# Patient Record
Sex: MALE | Race: WHITE | NOT HISPANIC OR LATINO | Employment: OTHER | URBAN - METROPOLITAN AREA
[De-identification: names, ages, dates, MRNs, and addresses within clinical notes are randomized per-mention and may not be internally consistent; named-entity substitution may affect disease eponyms.]

---

## 2023-04-06 ENCOUNTER — PATIENT OUTREACH (OUTPATIENT)
Dept: CARE COORDINATION | Facility: CLINIC | Age: 73
End: 2023-04-06
Payer: MEDICARE

## 2023-04-06 DIAGNOSIS — I31.9 MYOPERICARDITIS (HHS-HCC): ICD-10-CM

## 2023-04-06 NOTE — PROGRESS NOTES
Discharge Facility:[facility]  Discharge Diagnosis:I31.9 Myopericarditis  Admission Date:4/4/23  Discharge Date:4/5/23    PCP appt:4/14/23    Engagement  Call Start Time: 1018 (4/6/2023 10:21 AM)    Medications  Medications reviewed with patient/caregiver?: Yes (4/6/2023 10:21 AM)  Is the patient having any side effects they believe may be caused by any medication additions or changes?: No (4/6/2023 10:21 AM)  Does the patient have all medications ordered at discharge?: No (Unable to fill new meds. Patient's doctors are working together to find the most appropriate regimen) (4/6/2023 10:21 AM)  Is the patient taking all medications as directed (includes completed medication regime)?: Yes (Unable to fill new meds. Patient's doctors are working together to find the most appropriate regimen) (4/6/2023 10:21 AM)    Appointments  Does the patient have a primary care provider?: Yes (4/6/2023 10:21 AM)  Care Management Interventions: Verified appointment date/time/provider (4/6/2023 10:21 AM)  Has the patient kept scheduled appointments due by today?: Yes (4/6/2023 10:21 AM)  Care Management Interventions: Advised patient to keep appointment; Educated on importance of keeping appointment (4/6/2023 10:21 AM)    Self Management  Has home health visited the patient within 72 hours of discharge?: Not applicable (4/6/2023 10:21 AM)    Patient Teaching  Does the patient have access to their discharge instructions?: Yes (4/6/2023 10:21 AM)  Care Management Interventions: Reviewed instructions with patient (4/6/2023 10:21 AM)  What is the patient's perception of their health status since discharge?: Improving (4/6/2023 10:21 AM)  Is the patient/caregiver able to teach back the hierarchy of who to call/visit for symptoms/problems? PCP, Specialist, Home Health nurse, Urgent Care, ED, 911: Yes (4/6/2023 10:21 AM)

## 2023-04-14 ENCOUNTER — OFFICE VISIT (OUTPATIENT)
Dept: PRIMARY CARE | Facility: CLINIC | Age: 73
End: 2023-04-14
Payer: MEDICARE

## 2023-04-14 VITALS
BODY MASS INDEX: 28.49 KG/M2 | HEIGHT: 68 IN | SYSTOLIC BLOOD PRESSURE: 108 MMHG | TEMPERATURE: 96.7 F | HEART RATE: 84 BPM | OXYGEN SATURATION: 96 % | WEIGHT: 188 LBS | DIASTOLIC BLOOD PRESSURE: 68 MMHG

## 2023-04-14 DIAGNOSIS — I10 HYPERTENSION, UNSPECIFIED TYPE: ICD-10-CM

## 2023-04-14 DIAGNOSIS — E78.5 HYPERLIPIDEMIA, UNSPECIFIED HYPERLIPIDEMIA TYPE: ICD-10-CM

## 2023-04-14 DIAGNOSIS — I31.9 MYOPERICARDITIS (HHS-HCC): Primary | ICD-10-CM

## 2023-04-14 DIAGNOSIS — I25.10 CORONARY ARTERY DISEASE INVOLVING NATIVE HEART, UNSPECIFIED VESSEL OR LESION TYPE, UNSPECIFIED WHETHER ANGINA PRESENT: ICD-10-CM

## 2023-04-14 PROBLEM — N40.0 BPH (BENIGN PROSTATIC HYPERPLASIA): Status: ACTIVE | Noted: 2023-04-14

## 2023-04-14 PROBLEM — I24.9 ACS (ACUTE CORONARY SYNDROME) (MULTI): Status: ACTIVE | Noted: 2023-04-05

## 2023-04-14 PROBLEM — E11.40 CONTROLLED TYPE 2 DIABETES MELLITUS WITH DIABETIC NEUROPATHY (MULTI): Status: ACTIVE | Noted: 2023-04-14

## 2023-04-14 PROBLEM — M19.90 ARTHRITIS: Status: ACTIVE | Noted: 2023-04-14

## 2023-04-14 PROCEDURE — 99495 TRANSJ CARE MGMT MOD F2F 14D: CPT | Performed by: FAMILY MEDICINE

## 2023-04-14 PROCEDURE — 3044F HG A1C LEVEL LT 7.0%: CPT | Performed by: FAMILY MEDICINE

## 2023-04-14 PROCEDURE — 3078F DIAST BP <80 MM HG: CPT | Performed by: FAMILY MEDICINE

## 2023-04-14 PROCEDURE — 1160F RVW MEDS BY RX/DR IN RCRD: CPT | Performed by: FAMILY MEDICINE

## 2023-04-14 PROCEDURE — 1036F TOBACCO NON-USER: CPT | Performed by: FAMILY MEDICINE

## 2023-04-14 PROCEDURE — 1159F MED LIST DOCD IN RCRD: CPT | Performed by: FAMILY MEDICINE

## 2023-04-14 PROCEDURE — 3074F SYST BP LT 130 MM HG: CPT | Performed by: FAMILY MEDICINE

## 2023-04-14 PROCEDURE — 4010F ACE/ARB THERAPY RXD/TAKEN: CPT | Performed by: FAMILY MEDICINE

## 2023-04-14 RX ORDER — COLCHICINE 0.6 MG/1
0.6 TABLET ORAL 2 TIMES DAILY
COMMUNITY
Start: 2023-04-05 | End: 2023-04-14 | Stop reason: SDUPTHER

## 2023-04-14 RX ORDER — MULTIVITAMIN
1 TABLET ORAL DAILY
COMMUNITY
Start: 2019-04-05

## 2023-04-14 RX ORDER — NAPROXEN SODIUM 220 MG/1
81 TABLET, FILM COATED ORAL DAILY
COMMUNITY

## 2023-04-14 RX ORDER — MELOXICAM 15 MG/1
15 TABLET ORAL
COMMUNITY
Start: 2013-12-18

## 2023-04-14 RX ORDER — COLCHICINE 0.6 MG/1
0.6 TABLET ORAL 2 TIMES DAILY
Qty: 58 TABLET | Refills: 0 | Status: SHIPPED | OUTPATIENT
Start: 2023-04-14 | End: 2023-05-13

## 2023-04-14 RX ORDER — TAMSULOSIN HYDROCHLORIDE 0.4 MG/1
0.4 CAPSULE ORAL DAILY
COMMUNITY
Start: 2015-06-16 | End: 2023-12-07

## 2023-04-14 RX ORDER — NITROGLYCERIN 0.4 MG/1
0.4 TABLET SUBLINGUAL
COMMUNITY

## 2023-04-14 RX ORDER — LANCETS 30 GAUGE
EACH MISCELLANEOUS
COMMUNITY
Start: 2022-08-23

## 2023-04-14 RX ORDER — INDOMETHACIN 25 MG/1
25 CAPSULE ORAL
COMMUNITY
Start: 2023-04-05 | End: 2023-05-02 | Stop reason: SDUPTHER

## 2023-04-14 RX ORDER — FAMOTIDINE 40 MG/1
40 TABLET, FILM COATED ORAL DAILY
COMMUNITY
Start: 2016-04-04 | End: 2024-01-16

## 2023-04-14 RX ORDER — LANCETS 33 GAUGE
EACH MISCELLANEOUS
COMMUNITY
Start: 2023-02-16

## 2023-04-14 RX ORDER — BLOOD SUGAR DIAGNOSTIC
STRIP MISCELLANEOUS
COMMUNITY

## 2023-04-14 RX ORDER — FINASTERIDE 5 MG/1
5 TABLET, FILM COATED ORAL DAILY
COMMUNITY
Start: 2020-04-10 | End: 2023-07-31

## 2023-04-14 RX ORDER — ACETAMINOPHEN 500 MG
1000 TABLET ORAL
COMMUNITY
End: 2024-03-04

## 2023-04-14 RX ORDER — ATORVASTATIN CALCIUM 80 MG/1
80 TABLET, FILM COATED ORAL DAILY
COMMUNITY
Start: 2014-01-27 | End: 2024-03-25 | Stop reason: SDUPTHER

## 2023-04-14 RX ORDER — CLOPIDOGREL BISULFATE 75 MG/1
75 TABLET ORAL DAILY
COMMUNITY

## 2023-04-14 RX ORDER — AMLODIPINE BESYLATE 5 MG/1
5 TABLET ORAL DAILY
COMMUNITY
Start: 2020-04-10 | End: 2023-06-26

## 2023-04-14 RX ORDER — LISINOPRIL 40 MG/1
20 TABLET ORAL DAILY
COMMUNITY
Start: 2014-02-14 | End: 2023-10-19

## 2023-04-14 ASSESSMENT — ENCOUNTER SYMPTOMS
CHEST TIGHTNESS: 1
CHILLS: 0
EYE DISCHARGE: 0
LOSS OF SENSATION IN FEET: 1
OCCASIONAL FEELINGS OF UNSTEADINESS: 0
SHORTNESS OF BREATH: 0
DIFFICULTY URINATING: 0
DEPRESSION: 0
JOINT SWELLING: 0
COUGH: 0
POLYDIPSIA: 0

## 2023-04-14 ASSESSMENT — PATIENT HEALTH QUESTIONNAIRE - PHQ9
SUM OF ALL RESPONSES TO PHQ9 QUESTIONS 1 AND 2: 0
1. LITTLE INTEREST OR PLEASURE IN DOING THINGS: NOT AT ALL
2. FEELING DOWN, DEPRESSED OR HOPELESS: NOT AT ALL

## 2023-04-14 NOTE — PATIENT INSTRUCTIONS
Notes from hospital reviewed.    Please continue on colchicine.  Given the prescription for this.    Please have this filled at your pharmacy of choice.    Please follow-up with cardiology as directed.    If any troubles with reoccurrence of chest pain or shortness of breath or other change please call and let me know.

## 2023-04-14 NOTE — PROGRESS NOTES
"Patient: Emery Pedraza  : 1950  PCP: Jaime Fowler DO  MRN: [id number]  Program: No linked episodes     Emery Pderaza is a 72 y.o. male presenting today for follow-up after being discharged from the hospital 9 days ago. The main problem requiring admission was myopericarditis. The discharge summary and/or Transitional Care Management documentation was reviewed. Medication reconciliation was performed as indicated via the \"Nolan as Reviewed\" timestamp.     Emery Pedraza was contacted by Transitional Care Management services two days after his discharge. This encounter and supporting documentation was reviewed.  Patient presents after being hospitalized with myopericarditis.  The complexity of medical decision making for this patient's transitional care is moderate.    On indocin and cochicine0.    Review of Systems   Constitutional:  Negative for chills.   HENT:  Negative for congestion and dental problem.    Eyes:  Negative for discharge.   Respiratory:  Positive for chest tightness. Negative for cough and shortness of breath.    Cardiovascular:  Positive for chest pain.   Endocrine: Negative for cold intolerance and polydipsia.   Genitourinary:  Negative for difficulty urinating.   Musculoskeletal:  Negative for joint swelling.       No family history on file.    Engagement  Call Start Time: 1018 (2023 10:21 AM)    Medications  Medications reviewed with patient/caregiver?: Yes (2023 10:21 AM)  Is the patient having any side effects they believe may be caused by any medication additions or changes?: No (2023 10:21 AM)  Does the patient have all medications ordered at discharge?: No (Unable to fill new meds. Patient's doctors are working together to find the most appropriate regimen) (2023 10:21 AM)  Is the patient taking all medications as directed (includes completed medication regime)?: Yes (Unable to fill new meds. Patient's doctors are working together to find the most appropriate " "regimen) (4/6/2023 10:21 AM)    Appointments  Does the patient have a primary care provider?: Yes (4/6/2023 10:21 AM)  Care Management Interventions: Verified appointment date/time/provider (4/6/2023 10:21 AM)  Has the patient kept scheduled appointments due by today?: Yes (4/6/2023 10:21 AM)  Care Management Interventions: Advised patient to keep appointment; Educated on importance of keeping appointment (4/6/2023 10:21 AM)    Self Management  Has home health visited the patient within 72 hours of discharge?: Not applicable (4/6/2023 10:21 AM)    Patient Teaching  Does the patient have access to their discharge instructions?: Yes (4/6/2023 10:21 AM)  Care Management Interventions: Reviewed instructions with patient (4/6/2023 10:21 AM)  What is the patient's perception of their health status since discharge?: Improving (4/6/2023 10:21 AM)  Is the patient/caregiver able to teach back the hierarchy of who to call/visit for symptoms/problems? PCP, Specialist, Home Health nurse, Urgent Care, ED, 911: Yes (4/6/2023 10:21 AM)        No follow-ups on file.    Objective   /68   Pulse 84   Temp 35.9 °C (96.7 °F)   Ht 1.727 m (5' 8\")   Wt 85.3 kg (188 lb)   SpO2 96%   BMI 28.59 kg/m²   BSA Body surface area is 2.02 meters squared.    Physical Exam  Constitutional:       General: He is not in acute distress.     Appearance: Normal appearance. He is not ill-appearing.   HENT:      Head: Normocephalic.      Right Ear: Tympanic membrane normal.      Left Ear: Tympanic membrane normal.   Cardiovascular:      Rate and Rhythm: Regular rhythm.      Heart sounds: No murmur heard.     No friction rub. No gallop.   Pulmonary:      Effort: Pulmonary effort is normal.   Abdominal:      General: Abdomen is flat.   Musculoskeletal:         General: No swelling.      Cervical back: Normal range of motion.   Neurological:      Mental Status: He is alert.       Legacy Encounter on 01/17/2023   Component Date Value Ref Range Status "    Glucose 01/17/2023 103 (H)  74 - 99 mg/dL Final    Sodium 01/17/2023 141  136 - 145 mmol/L Final    Potassium 01/17/2023 4.4  3.5 - 5.3 mmol/L Final    Chloride 01/17/2023 105  98 - 107 mmol/L Final    Bicarbonate 01/17/2023 28  21 - 32 mmol/L Final    Anion Gap 01/17/2023 12  10 - 20 mmol/L Final    Urea Nitrogen 01/17/2023 23  6 - 23 mg/dL Final    Creatinine 01/17/2023 1.13  0.50 - 1.30 mg/dL Final    GFR MALE 01/17/2023 68  >90 mL/min/1.73m2 Final    Comment:  CALCULATIONS OF ESTIMATED GFR ARE PERFORMED   USING THE 2021 CKD-EPI STUDY REFIT EQUATION   WITHOUT THE RACE VARIABLE FOR THE IDMS-TRACEABLE   CREATININE METHODS.    https://jasn.asnjournals.org/content/early/2021/09/22/ASN.9622242662      Calcium 01/17/2023 9.6  8.6 - 10.6 mg/dL Final    Albumin 01/17/2023 4.8  3.4 - 5.0 g/dL Final    Alkaline Phosphatase 01/17/2023 79  33 - 136 U/L Final    Total Protein 01/17/2023 6.5  6.4 - 8.2 g/dL Final    AST 01/17/2023 14  9 - 39 U/L Final    Total Bilirubin 01/17/2023 1.0  0.0 - 1.2 mg/dL Final    ALT (SGPT) 01/17/2023 22  10 - 52 U/L Final    Comment:  Patients treated with Sulfasalazine may generate    falsely decreased results for ALT.      Prostate Specific Antigen,Screen 01/17/2023 2.04  0.00 - 4.00 ng/mL Final    Comment: The FDA requires that the method used for PSA assay be   reported to the physician. Values obtained with different   assay methods must not be used interchangeably. This test   was performed at [facility] using the Siemens  [employer] PSA method, which is a sandwich immunoassay using   chemiluminescence for quantitation. The assay is approved  for measurement of prostate-specific antigen (PSA) in   serum and may be used in conjunction with a digital rectal  examination in men 50 years and older as an aid in   detection of prostate cancer.   5-Alpha-reductase inhibitors (e.g. Proscar, Finasteride,   Avodart, Dutasteride and Rhina) for the treatment of BPH   have been shown  to lower PSA levels by an average of 50%   after 6 months of treatment.      Cholesterol 01/17/2023 168  0 - 199 mg/dL Final    Comment: .      AGE      DESIRABLE   BORDERLINE HIGH   HIGH     0-19 Y     0 - 169       170 - 199     >/= 200    20-24 Y     0 - 189       190 - 224     >/= 225         >24 Y     0 - 199       200 - 239     >/= 240   **All ranges are based on fasting samples. Specific   therapeutic targets will vary based on patient-specific   cardiac risk.  .   Pediatric guidelines reference:Pediatrics 2011, 128(S5).   Adult guidelines reference: NCEP ATPIII Guidelines,     MARSHALL 2001, 258:2486-97  .   Venipuncture immediately after or during the    administration of Metamizole may lead to falsely   low results. Testing should be performed immediately   prior to Metamizole dosing.      HDL 01/17/2023 56.7  mg/dL Final    Comment: .      AGE      VERY LOW   LOW     NORMAL    HIGH       0-19 Y       < 35   < 40     40-45     ----    20-24 Y       ----   < 40       >45     ----      >24 Y       ----   < 40     40-60      >60  .      Cholesterol/HDL Ratio 01/17/2023 3.0   Final    Comment: REF VALUES  DESIRABLE  < 3.4  HIGH RISK  > 5.0      LDL 01/17/2023 93  0 - 99 mg/dL Final    Comment: .                           NEAR      BORD      AGE      DESIRABLE  OPTIMAL    HIGH     HIGH     VERY HIGH     0-19 Y     0 - 109     ---    110-129   >/= 130     ----    20-24 Y     0 - 119     ---    120-159   >/= 160     ----      >24 Y     0 -  99   100-129  130-159   160-189     >/=190  .      VLDL 01/17/2023 18  0 - 40 mg/dL Final    Triglycerides 01/17/2023 90  0 - 149 mg/dL Final    Comment: .      AGE      DESIRABLE   BORDERLINE HIGH   HIGH     VERY HIGH   0 D-90 D    19 - 174         ----         ----        ----  91 D- 9 Y     0 -  74        75 -  99     >/= 100      ----    10-19 Y     0 -  89        90 - 129     >/= 130      ----    20-24 Y     0 - 114       115 - 149     >/= 150      ----         >24 Y     0 -  149       150 - 199    200- 499    >/= 500  .   Venipuncture immediately after or during the    administration of Metamizole may lead to falsely   low results. Testing should be performed immediately   prior to Metamizole dosing.      TSH 01/17/2023 2.02  0.44 - 3.98 mIU/L Final    Comment:  TSH testing is performed using different testing    methodology at [facility] than at other    [facility]. Direct result comparisons should    only be made within the same method.      Hemoglobin A1C 01/17/2023 5.6  % Final    Comment:      Diagnosis of Diabetes-Adults   Non-Diabetic: < or = 5.6%   Increased risk for developing diabetes: 5.7-6.4%   Diagnostic of diabetes: > or = 6.5%  .       Monitoring of Diabetes                Age (y)     Therapeutic Goal (%)   Adults:          >18           <7.0   Pediatrics:    13-18           <7.5                   7-12           <8.0                   0- 6            7.5-8.5   American Diabetes Association. Diabetes Care 33(S1), Jan 2010.      Estimated Average Glucose 01/17/2023 114  MG/DL Final    WBC 01/17/2023 6.9  4.4 - 11.3 x10E9/L Final    nRBC 01/17/2023 0.0  0.0 - 0.0 /100 WBC Final    RBC 01/17/2023 5.69  4.50 - 5.90 x10E12/L Final    Hemoglobin 01/17/2023 16.1  13.5 - 17.5 g/dL Final    Hematocrit 01/17/2023 49.4  41.0 - 52.0 % Final    MCV 01/17/2023 87  80 - 100 fL Final    MCHC 01/17/2023 32.6  32.0 - 36.0 g/dL Final    Platelets 01/17/2023 223  150 - 450 x10E9/L Final    RDW 01/17/2023 13.4  11.5 - 14.5 % Final    Neutrophils % 01/17/2023 72.0  40.0 - 80.0 % Final    Immature Granulocytes %, Automated 01/17/2023 0.4  0.0 - 0.9 % Final    Comment:  Immature Granulocyte Count (IG) includes promyelocytes,    myelocytes and metamyelocytes but does not include bands.   Percent differential counts (%) should be interpreted in the   context of the absolute cell counts (cells/L).      Lymphocytes % 01/17/2023 16.8  13.0 - 44.0 % Final    Monocytes % 01/17/2023  8.3  2.0 - 10.0 % Final    Eosinophils % 01/17/2023 1.5  0.0 - 6.0 % Final    Basophils % 01/17/2023 1.0  0.0 - 2.0 % Final    Neutrophils Absolute 01/17/2023 4.94  1.60 - 5.50 x10E9/L Final    Lymphocytes Absolute 01/17/2023 1.15  0.80 - 3.00 x10E9/L Final    Monocytes Absolute 01/17/2023 0.57  0.05 - 0.80 x10E9/L Final    Eosinophils Absolute 01/17/2023 0.10  0.00 - 0.40 x10E9/L Final    Basophils Absolute 01/17/2023 0.07  0.00 - 0.10 x10E9/L Final   Legacy Encounter on 09/28/2022   Component Date Value Ref Range Status    Ventricular Rate 09/28/2022 74  BPM Final    Atrial Rate 09/28/2022 74  BPM Final    AZ Interval 09/28/2022 148  ms Final    QRS Duration 09/28/2022 96  ms Final    QT Interval 09/28/2022 394  ms Final    QTC Calculation(Bazett) 09/28/2022 437  ms Final    P Axis 09/28/2022 71  degrees Final    R Axis 09/28/2022 45  degrees Final    T Axis 09/28/2022 61  degrees Final    QRS Count 09/28/2022 12  beats Final    Q Onset 09/28/2022 218  ms Final    P Onset 09/28/2022 144  ms Final    P Offset 09/28/2022 199  ms Final    T Offset 09/28/2022 415  ms Final    QTC Fredericia 09/28/2022 422  ms Final   Legacy Encounter on 08/22/2022   Component Date Value Ref Range Status    Hemoglobin A1C 08/22/2022 5.4  % Final    Comment:      Diagnosis of Diabetes-Adults   Non-Diabetic: < or = 5.6%   Increased risk for developing diabetes: 5.7-6.4%   Diagnostic of diabetes: > or = 6.5%  .       Monitoring of Diabetes                Age (y)     Therapeutic Goal (%)   Adults:          >18           <7.0   Pediatrics:    13-18           <7.5                   7-12           <8.0                   0- 6            7.5-8.5   American Diabetes Association. Diabetes Care 33(S1), Jan 2010.      Estimated Average Glucose 08/22/2022 108  MG/DL Final    Glucose 08/22/2022 99  74 - 99 mg/dL Final    Sodium 08/22/2022 142  136 - 145 mmol/L Final    Potassium 08/22/2022 4.2  3.5 - 5.3 mmol/L Final    Chloride 08/22/2022 107  98 -  107 mmol/L Final    Bicarbonate 08/22/2022 26  21 - 32 mmol/L Final    Anion Gap 08/22/2022 13  10 - 20 mmol/L Final    Urea Nitrogen 08/22/2022 18  6 - 23 mg/dL Final    Creatinine 08/22/2022 1.11  0.50 - 1.30 mg/dL Final    GFR MALE 08/22/2022 70  >90 mL/min/1.73m2 Final    Comment:  CALCULATIONS OF ESTIMATED GFR ARE PERFORMED   USING THE 2021 CKD-EPI STUDY REFIT EQUATION   WITHOUT THE RACE VARIABLE FOR THE IDMS-TRACEABLE   CREATININE METHODS.    https://jasn.asnjournals.org/content/early/2021/09/22/ASN.3154519357      Calcium 08/22/2022 9.3  8.6 - 10.6 mg/dL Final    Albumin 08/22/2022 4.6  3.4 - 5.0 g/dL Final    Alkaline Phosphatase 08/22/2022 71  33 - 136 U/L Final    Total Protein 08/22/2022 6.3 (L)  6.4 - 8.2 g/dL Final    AST 08/22/2022 16  9 - 39 U/L Final    Total Bilirubin 08/22/2022 1.0  0.0 - 1.2 mg/dL Final    ALT (SGPT) 08/22/2022 19  10 - 52 U/L Final    Comment:  Patients treated with Sulfasalazine may generate    falsely decreased results for ALT.       Current Outpatient Medications on File Prior to Visit   Medication Sig Dispense Refill    amLODIPine (Norvasc) 5 mg tablet Take 1 tablet (5 mg) by mouth once daily.      aspirin 81 mg chewable tablet Chew 1 tablet (81 mg) once daily.      atorvastatin (Lipitor) 80 mg tablet Take 1 tablet (80 mg) by mouth once daily.      clopidogrel (Plavix) 75 mg tablet Take 1 tablet (75 mg) by mouth once daily.      colchicine 0.6 mg tablet Take 1 tablet (0.6 mg) by mouth in the morning and 1 tablet (0.6 mg) in the evening.      famotidine (Pepcid) 40 mg tablet Take 1 tablet (40 mg) by mouth once daily.      finasteride (Proscar) 5 mg tablet Take 1 tablet (5 mg) by mouth once daily.      indomethacin (Indocin) 25 mg capsule Take 1 capsule (25 mg) by mouth in the morning and 1 capsule (25 mg) at noon and 1 capsule (25 mg) in the evening. Take with meals.      lisinopril 40 mg tablet Take 0.5 tablets (20 mg) by mouth once daily.      meloxicam (Mobic) 15 mg tablet  Take 1 tablet (15 mg) by mouth.      multivitamin tablet Take 1 tablet by mouth once daily.      nitroglycerin (Nitrostat) 0.4 mg SL tablet Place 1 tablet (0.4 mg) under the tongue.      omega-3 (Fish Oil) 300-1,000 mg capsule Take 1 capsule (1,000 mg) by mouth every 36 hours.      OneTouch Delica Plus Lancet 33 gauge misc CHECK BLOOD SUGARS 2 TIMES DAILY      OneTouch Ultra Test strip USE TO TEST TWICE DAILY AS DIRECTED      OneTouch Ultra2 Meter misc USE TO TEST BLOOD SUGAR TWICE DAILY      tamsulosin (Flomax) 0.4 mg 24 hr capsule Take 1 capsule (0.4 mg) by mouth once daily.       No current facility-administered medications on file prior to visit.     No images are attached to the encounter.            Assessment/Plan   Problem List Items Addressed This Visit          Circulatory    Myopericarditis - Primary     Doing well.    Prescription for colchicine given today.         Relevant Medications    nitroglycerin (Nitrostat) 0.4 mg SL tablet    clopidogrel (Plavix) 75 mg tablet    amLODIPine (Norvasc) 5 mg tablet    colchicine 0.6 mg tablet    Hypertension     Blood pressure well controlled         Coronary artery disease involving native heart     This has been stable.  Please continue present regimen         Relevant Medications    nitroglycerin (Nitrostat) 0.4 mg SL tablet    clopidogrel (Plavix) 75 mg tablet    amLODIPine (Norvasc) 5 mg tablet       Other    Hyperlipidemia     This has been well controlled

## 2023-04-18 DIAGNOSIS — I31.9 MYOPERICARDITIS (HHS-HCC): ICD-10-CM

## 2023-04-26 ENCOUNTER — PATIENT OUTREACH (OUTPATIENT)
Dept: CARE COORDINATION | Facility: CLINIC | Age: 73
End: 2023-04-26
Payer: MEDICARE

## 2023-04-26 NOTE — PROGRESS NOTES
Unable to reach patient for call back after patient's follow up appointment with PCP.   BENM with call back number for patient to call if needed   If no voicemail available call attempts x 2 were made to contact the patient to assist with any questions or concerns patient may have.

## 2023-05-02 DIAGNOSIS — I31.9 MYOPERICARDITIS (HHS-HCC): ICD-10-CM

## 2023-05-02 DIAGNOSIS — I31.9 MYOPERICARDITIS (HHS-HCC): Primary | ICD-10-CM

## 2023-05-02 RX ORDER — INDOMETHACIN 25 MG/1
25 CAPSULE ORAL
Qty: 87 CAPSULE | Refills: 0 | Status: SHIPPED | OUTPATIENT
Start: 2023-05-02 | End: 2023-05-31

## 2023-05-02 RX ORDER — INDOMETHACIN 25 MG/1
25 CAPSULE ORAL
Qty: 90 CAPSULE | Refills: 0 | OUTPATIENT
Start: 2023-05-02 | End: 2023-06-01

## 2023-05-08 DIAGNOSIS — I31.9 MYOPERICARDITIS (HHS-HCC): ICD-10-CM

## 2023-05-29 DIAGNOSIS — I31.9 MYOPERICARDITIS (HHS-HCC): ICD-10-CM

## 2023-06-06 ENCOUNTER — PATIENT OUTREACH (OUTPATIENT)
Dept: CARE COORDINATION | Facility: CLINIC | Age: 73
End: 2023-06-06
Payer: MEDICARE

## 2023-06-06 NOTE — PROGRESS NOTES
Call placed regarding one month post discharge follow up call.  At time of outreach call the patient feels as if their condition has improved since initial visit with PCP or specialist.Questions or concerns regarding recovery period addressed at this time. Reviewed any PCP or specialists progress notes/labs/radiology reports if applicable and addressed any questions or concerns. Patient states he saw his cardiologist yesterday and is doing fine.

## 2023-06-25 DIAGNOSIS — I10 ESSENTIAL (PRIMARY) HYPERTENSION: ICD-10-CM

## 2023-06-26 RX ORDER — AMLODIPINE BESYLATE 5 MG/1
TABLET ORAL
Qty: 90 TABLET | Refills: 3 | Status: SHIPPED | OUTPATIENT
Start: 2023-06-26

## 2023-07-11 ENCOUNTER — PATIENT OUTREACH (OUTPATIENT)
Dept: CARE COORDINATION | Facility: CLINIC | Age: 73
End: 2023-07-11
Payer: MEDICARE

## 2023-07-11 NOTE — PROGRESS NOTES
Unable to reach patient for two month post discharge follow up call.   M with call back number for patient to call if needed   If no voicemail available call attempts x 2 were made to contact the patient to assist with any questions or concerns patient may have.

## 2023-07-31 ENCOUNTER — PATIENT OUTREACH (OUTPATIENT)
Dept: CARE COORDINATION | Facility: CLINIC | Age: 73
End: 2023-07-31
Payer: MEDICARE

## 2023-07-31 DIAGNOSIS — N40.0 BENIGN PROSTATIC HYPERPLASIA WITHOUT LOWER URINARY TRACT SYMPTOMS: ICD-10-CM

## 2023-07-31 DIAGNOSIS — R42 VERTIGO: ICD-10-CM

## 2023-07-31 RX ORDER — FINASTERIDE 5 MG/1
5 TABLET, FILM COATED ORAL DAILY
Qty: 90 TABLET | Refills: 3 | Status: SHIPPED | OUTPATIENT
Start: 2023-07-31

## 2023-07-31 NOTE — PROGRESS NOTES
Discharge Facility:[facility]  Discharge Diagnosis:R42 Vertigo  Admission Date:7/27/23  Discharge Date: 7/28/23    PCP Appointment Date:No contact task sent to office  Specialist Appointment Date:   Hospital Encounter and Summary: not available at this time

## 2023-08-15 ENCOUNTER — PATIENT OUTREACH (OUTPATIENT)
Dept: CARE COORDINATION | Facility: CLINIC | Age: 73
End: 2023-08-15
Payer: MEDICARE

## 2023-08-24 RX ORDER — LANCETS 33 GAUGE
EACH MISCELLANEOUS
COMMUNITY
Start: 2022-08-23

## 2023-08-24 RX ORDER — MECLIZINE HYDROCHLORIDE 25 MG/1
25 TABLET ORAL 3 TIMES DAILY PRN
COMMUNITY
Start: 2023-08-01

## 2023-08-24 RX ORDER — OMEGA-3 FATTY ACIDS/FISH OIL 340-1000MG
CAPSULE ORAL
COMMUNITY

## 2023-08-24 RX ORDER — CHOLECALCIFEROL (VITAMIN D3) 25 MCG
1 TABLET ORAL DAILY
COMMUNITY
End: 2024-03-04

## 2023-08-29 RX ORDER — INDOMETHACIN 25 MG/1
25 CAPSULE ORAL
Qty: 87 CAPSULE | Refills: 0 | OUTPATIENT
Start: 2023-08-29 | End: 2023-09-27

## 2023-08-29 RX ORDER — COLCHICINE 0.6 MG/1
TABLET ORAL
Qty: 58 TABLET | Refills: 0 | OUTPATIENT
Start: 2023-08-29

## 2023-08-30 ENCOUNTER — APPOINTMENT (OUTPATIENT)
Dept: PRIMARY CARE | Facility: CLINIC | Age: 73
End: 2023-08-30
Payer: MEDICARE

## 2023-08-31 ENCOUNTER — APPOINTMENT (OUTPATIENT)
Dept: PRIMARY CARE | Facility: CLINIC | Age: 73
End: 2023-08-31
Payer: MEDICARE

## 2023-08-31 ENCOUNTER — LAB (OUTPATIENT)
Dept: LAB | Facility: LAB | Age: 73
End: 2023-08-31
Payer: MEDICARE

## 2023-08-31 ENCOUNTER — OFFICE VISIT (OUTPATIENT)
Dept: PRIMARY CARE | Facility: CLINIC | Age: 73
End: 2023-08-31
Payer: MEDICARE

## 2023-08-31 VITALS
DIASTOLIC BLOOD PRESSURE: 76 MMHG | SYSTOLIC BLOOD PRESSURE: 124 MMHG | OXYGEN SATURATION: 97 % | WEIGHT: 186 LBS | TEMPERATURE: 97.7 F | HEIGHT: 67 IN | HEART RATE: 88 BPM | BODY MASS INDEX: 29.19 KG/M2

## 2023-08-31 DIAGNOSIS — G62.9 NEUROPATHY: ICD-10-CM

## 2023-08-31 DIAGNOSIS — R42 VERTIGO: ICD-10-CM

## 2023-08-31 DIAGNOSIS — I10 HYPERTENSION, UNSPECIFIED TYPE: Chronic | ICD-10-CM

## 2023-08-31 DIAGNOSIS — H25.093 AGE-RELATED INCIPIENT CATARACT OF BOTH EYES: Primary | ICD-10-CM

## 2023-08-31 DIAGNOSIS — R73.03 PRE-DIABETES: ICD-10-CM

## 2023-08-31 DIAGNOSIS — Z01.818 PREOP EXAMINATION: ICD-10-CM

## 2023-08-31 PROBLEM — R41.3 MEMORY CHANGES: Status: ACTIVE | Noted: 2023-08-31

## 2023-08-31 PROBLEM — M25.50 ACHE IN JOINT: Status: ACTIVE | Noted: 2023-08-31

## 2023-08-31 PROBLEM — I30.0 ACUTE IDIOPATHIC PERICARDITIS (HHS-HCC): Status: ACTIVE | Noted: 2023-08-31

## 2023-08-31 PROBLEM — R20.0 NUMBNESS OF TOES: Status: ACTIVE | Noted: 2023-08-31

## 2023-08-31 PROBLEM — K59.00 CONSTIPATION: Status: ACTIVE | Noted: 2023-08-31

## 2023-08-31 PROBLEM — R07.2 PRECORDIAL PAIN: Chronic | Status: ACTIVE | Noted: 2023-08-31

## 2023-08-31 PROBLEM — R73.01 ELEVATED FASTING GLUCOSE: Status: ACTIVE | Noted: 2023-08-31

## 2023-08-31 PROBLEM — M54.2 NECK PAIN: Status: ACTIVE | Noted: 2023-08-31

## 2023-08-31 PROBLEM — M54.16 LUMBAR RADICULOPATHY: Status: ACTIVE | Noted: 2023-08-31

## 2023-08-31 PROBLEM — M47.812 DJD (DEGENERATIVE JOINT DISEASE) OF CERVICAL SPINE: Status: ACTIVE | Noted: 2023-08-31

## 2023-08-31 PROBLEM — R73.01 IMPAIRED FASTING GLUCOSE: Status: ACTIVE | Noted: 2023-08-31

## 2023-08-31 PROBLEM — H25.811 COMBINED FORM OF AGE-RELATED CATARACT, RIGHT EYE: Status: ACTIVE | Noted: 2023-08-31

## 2023-08-31 PROBLEM — Z95.5 PRESENCE OF CORONARY ANGIOPLASTY IMPLANT AND GRAFT: Chronic | Status: ACTIVE | Noted: 2023-08-31

## 2023-08-31 PROBLEM — M54.30 SCIATICA: Status: ACTIVE | Noted: 2023-08-31

## 2023-08-31 PROBLEM — L29.9 ITCHING: Status: ACTIVE | Noted: 2023-08-31

## 2023-08-31 PROBLEM — I21.3 STEMI (ST ELEVATION MYOCARDIAL INFARCTION) (MULTI): Status: ACTIVE | Noted: 2023-08-31

## 2023-08-31 PROBLEM — R51.9 CEPHALGIA: Status: ACTIVE | Noted: 2023-08-31

## 2023-08-31 PROBLEM — R07.2 PRECORDIAL PAIN: Status: ACTIVE | Noted: 2023-08-31

## 2023-08-31 PROBLEM — E66.9 OBESITY: Status: ACTIVE | Noted: 2023-08-31

## 2023-08-31 PROBLEM — H61.22 IMPACTED CERUMEN OF LEFT EAR: Status: ACTIVE | Noted: 2023-08-31

## 2023-08-31 PROBLEM — G57.93 NEUROPATHY OF BOTH FEET: Status: ACTIVE | Noted: 2023-08-31

## 2023-08-31 PROBLEM — R06.02 SHORTNESS OF BREATH: Status: ACTIVE | Noted: 2023-08-31

## 2023-08-31 PROBLEM — K64.4 EXTERNAL HEMORRHOID: Status: ACTIVE | Noted: 2023-08-31

## 2023-08-31 PROBLEM — R79.89 ELEVATED SERUM CREATININE: Status: ACTIVE | Noted: 2023-08-31

## 2023-08-31 PROBLEM — H43.393 VITREOUS FLOATERS OF BOTH EYES: Status: ACTIVE | Noted: 2023-08-31

## 2023-08-31 PROBLEM — R05.9 COUGH: Status: ACTIVE | Noted: 2023-08-31

## 2023-08-31 PROBLEM — E78.5 HYPERLIPIDEMIA: Chronic | Status: ACTIVE | Noted: 2023-04-14

## 2023-08-31 PROBLEM — H35.3131 EARLY STAGE DRY AGE-RELATED MACULAR DEGENERATION OF BOTH EYES: Status: ACTIVE | Noted: 2023-08-31

## 2023-08-31 PROBLEM — E04.1 THYROID NODULE: Status: ACTIVE | Noted: 2023-08-31

## 2023-08-31 PROBLEM — H53.002 AMBLYOPIA OF LEFT EYE: Status: ACTIVE | Noted: 2023-08-31

## 2023-08-31 PROCEDURE — 1036F TOBACCO NON-USER: CPT | Performed by: FAMILY MEDICINE

## 2023-08-31 PROCEDURE — 1159F MED LIST DOCD IN RCRD: CPT | Performed by: FAMILY MEDICINE

## 2023-08-31 PROCEDURE — 4010F ACE/ARB THERAPY RXD/TAKEN: CPT | Performed by: FAMILY MEDICINE

## 2023-08-31 PROCEDURE — 3074F SYST BP LT 130 MM HG: CPT | Performed by: FAMILY MEDICINE

## 2023-08-31 PROCEDURE — 1126F AMNT PAIN NOTED NONE PRSNT: CPT | Performed by: FAMILY MEDICINE

## 2023-08-31 PROCEDURE — 1160F RVW MEDS BY RX/DR IN RCRD: CPT | Performed by: FAMILY MEDICINE

## 2023-08-31 PROCEDURE — 99214 OFFICE O/P EST MOD 30 MIN: CPT | Performed by: FAMILY MEDICINE

## 2023-08-31 PROCEDURE — 3044F HG A1C LEVEL LT 7.0%: CPT | Performed by: FAMILY MEDICINE

## 2023-08-31 PROCEDURE — 36415 COLL VENOUS BLD VENIPUNCTURE: CPT

## 2023-08-31 PROCEDURE — 80048 BASIC METABOLIC PNL TOTAL CA: CPT

## 2023-08-31 PROCEDURE — 3078F DIAST BP <80 MM HG: CPT | Performed by: FAMILY MEDICINE

## 2023-08-31 RX ORDER — CIPROFLOXACIN HYDROCHLORIDE 3 MG/ML
1 SOLUTION/ DROPS OPHTHALMIC 4 TIMES DAILY
COMMUNITY
Start: 2023-08-31 | End: 2024-03-04

## 2023-08-31 RX ORDER — PREDNISOLONE ACETATE 10 MG/ML
1 SUSPENSION/ DROPS OPHTHALMIC 4 TIMES DAILY
COMMUNITY
Start: 2023-08-31 | End: 2024-03-04

## 2023-08-31 ASSESSMENT — ENCOUNTER SYMPTOMS
APNEA: 0
CHEST TIGHTNESS: 0
HYPERACTIVE: 0
CHOKING: 0
CARDIOVASCULAR NEGATIVE: 1
CONFUSION: 0
ACTIVITY CHANGE: 0
DIAPHORESIS: 0
WEAKNESS: 0

## 2023-08-31 NOTE — PATIENT INSTRUCTIONS
Going to refer to neurology regarding vertigo.    Will fill out form for preop clearance.    Medications reviewed and reconciled.    Labs have been reviewed.    Please continue to follow-up with cardiology regarding the myopericarditis.    Going to have you follow-up with neurology regarding vertigo.    Reviewing hospital reports showed elevation of creatinine we are going to recheck basic metabolic panel.    Cleared for surgery for cataract removal

## 2023-08-31 NOTE — PROGRESS NOTES
"Subjective   Patient ID: Emery Pedraza is a 73 y.o. male who presents for Pre-op Exam (Cataracts by Dr. Galdino Morales on 9/13/23).    Patient presents for preop clearance.  Patient with history of bilateral cataracts going to have these taken care of.    Patient with history of mild pericarditis.  This has resolved he is totally off medications.  He did have an episode of vertigo would like to see a neurologist.  This has resolved but he ended up going to the ER had MRIs of the brain and cervical spine area he really did not come up with much causing it.  He has had no troubles with chest pain or shortness of breath no troubles with nausea vomiting or abdominal pain or discomfort.     patient presents for clearance for surgery patient have cataract performed.    R side.    Referral neurology.  Vertigo Dr. Watson.    Review of Systems   Constitutional:  Negative for activity change and diaphoresis.   HENT:  Negative for congestion and dental problem.    Eyes:  Positive for visual disturbance.   Respiratory:  Negative for apnea, choking and chest tightness.    Cardiovascular: Negative.  Negative for chest pain.   Neurological:  Negative for syncope and weakness.        Vertigo     Psychiatric/Behavioral:  Negative for behavioral problems and confusion. The patient is not hyperactive.        Objective   /76   Pulse 88   Temp 36.5 °C (97.7 °F)   Ht 1.702 m (5' 7\")   Wt 84.4 kg (186 lb)   SpO2 97%   BMI 29.13 kg/m²   BSA Body surface area is 2 meters squared.      Physical Exam  Constitutional:       Appearance: Normal appearance.   Cardiovascular:      Rate and Rhythm: Normal rate and regular rhythm.      Pulses: Normal pulses.      Heart sounds: Normal heart sounds.   Pulmonary:      Effort: Pulmonary effort is normal.      Breath sounds: Normal breath sounds.   Abdominal:      General: Abdomen is flat.      Palpations: Abdomen is soft.   Neurological:      Mental Status: He is alert.     Some decrease " sensation noted with monofilament testing  Legacy Encounter on 01/17/2023   Component Date Value Ref Range Status    Glucose 01/17/2023 103 (H)  74 - 99 mg/dL Final    Sodium 01/17/2023 141  136 - 145 mmol/L Final    Potassium 01/17/2023 4.4  3.5 - 5.3 mmol/L Final    Chloride 01/17/2023 105  98 - 107 mmol/L Final    Bicarbonate 01/17/2023 28  21 - 32 mmol/L Final    Anion Gap 01/17/2023 12  10 - 20 mmol/L Final    Urea Nitrogen 01/17/2023 23  6 - 23 mg/dL Final    Creatinine 01/17/2023 1.13  0.50 - 1.30 mg/dL Final    GFR MALE 01/17/2023 68  >90 mL/min/1.73m2 Final    Comment:  CALCULATIONS OF ESTIMATED GFR ARE PERFORMED   USING THE 2021 CKD-EPI STUDY REFIT EQUATION   WITHOUT THE RACE VARIABLE FOR THE IDMS-TRACEABLE   CREATININE METHODS.    https://jasn.asnjournals.org/content/early/2021/09/22/ASN.7428216106      Calcium 01/17/2023 9.6  8.6 - 10.6 mg/dL Final    Albumin 01/17/2023 4.8  3.4 - 5.0 g/dL Final    Alkaline Phosphatase 01/17/2023 79  33 - 136 U/L Final    Total Protein 01/17/2023 6.5  6.4 - 8.2 g/dL Final    AST 01/17/2023 14  9 - 39 U/L Final    Total Bilirubin 01/17/2023 1.0  0.0 - 1.2 mg/dL Final    ALT (SGPT) 01/17/2023 22  10 - 52 U/L Final    Comment:  Patients treated with Sulfasalazine may generate    falsely decreased results for ALT.      Prostate Specific Antigen,Screen 01/17/2023 2.04  0.00 - 4.00 ng/mL Final    Comment: The FDA requires that the method used for PSA assay be   reported to the physician. Values obtained with different   assay methods must not be used interchangeably. This test   was performed at [facility] using the Siemens  Pitadelallica PSA method, which is a sandwich immunoassay using   chemiluminescence for quantitation. The assay is approved  for measurement of prostate-specific antigen (PSA) in   serum and may be used in conjunction with a digital rectal  examination in men 50 years and older as an aid in   detection of prostate cancer.   5-Alpha-reductase  inhibitors (e.g. Proscar, Finasteride,   Avodart, Dutasteride and Rhina) for the treatment of BPH   have been shown to lower PSA levels by an average of 50%   after 6 months of treatment.      Cholesterol 01/17/2023 168  0 - 199 mg/dL Final    Comment: .      AGE      DESIRABLE   BORDERLINE HIGH   HIGH     0-19 Y     0 - 169       170 - 199     >/= 200    20-24 Y     0 - 189       190 - 224     >/= 225         >24 Y     0 - 199       200 - 239     >/= 240   **All ranges are based on fasting samples. Specific   therapeutic targets will vary based on patient-specific   cardiac risk.  .   Pediatric guidelines reference:Pediatrics 2011, 128(S5).   Adult guidelines reference: NCEP ATPIII Guidelines,     MARSHALL 2001, 258:2486-97  .   Venipuncture immediately after or during the    administration of Metamizole may lead to falsely   low results. Testing should be performed immediately   prior to Metamizole dosing.      HDL 01/17/2023 56.7  mg/dL Final    Comment: .      AGE      VERY LOW   LOW     NORMAL    HIGH       0-19 Y       < 35   < 40     40-45     ----    20-24 Y       ----   < 40       >45     ----      >24 Y       ----   < 40     40-60      >60  .      Cholesterol/HDL Ratio 01/17/2023 3.0   Final    Comment: REF VALUES  DESIRABLE  < 3.4  HIGH RISK  > 5.0      LDL 01/17/2023 93  0 - 99 mg/dL Final    Comment: .                           NEAR      BORD      AGE      DESIRABLE  OPTIMAL    HIGH     HIGH     VERY HIGH     0-19 Y     0 - 109     ---    110-129   >/= 130     ----    20-24 Y     0 - 119     ---    120-159   >/= 160     ----      >24 Y     0 -  99   100-129  130-159   160-189     >/=190  .      VLDL 01/17/2023 18  0 - 40 mg/dL Final    Triglycerides 01/17/2023 90  0 - 149 mg/dL Final    Comment: .      AGE      DESIRABLE   BORDERLINE HIGH   HIGH     VERY HIGH   0 D-90 D    19 - 174         ----         ----        ----  91 D- 9 Y     0 -  74        75 -  99     >/= 100      ----    10-19 Y     0 -  89         90 - 129     >/= 130      ----    20-24 Y     0 - 114       115 - 149     >/= 150      ----         >24 Y     0 - 149       150 - 199    200- 499    >/= 500  .   Venipuncture immediately after or during the    administration of Metamizole may lead to falsely   low results. Testing should be performed immediately   prior to Metamizole dosing.      TSH 01/17/2023 2.02  0.44 - 3.98 mIU/L Final    Comment:  TSH testing is performed using different testing    methodology at [facility] than at other    [facility]. Direct result comparisons should    only be made within the same method.      Hemoglobin A1C 01/17/2023 5.6  % Final    Comment:      Diagnosis of Diabetes-Adults   Non-Diabetic: < or = 5.6%   Increased risk for developing diabetes: 5.7-6.4%   Diagnostic of diabetes: > or = 6.5%  .       Monitoring of Diabetes                Age (y)     Therapeutic Goal (%)   Adults:          >18           <7.0   Pediatrics:    13-18           <7.5                   7-12           <8.0                   0- 6            7.5-8.5   American Diabetes Association. Diabetes Care 33(S1), Jan 2010.      Estimated Average Glucose 01/17/2023 114  MG/DL Final    WBC 01/17/2023 6.9  4.4 - 11.3 x10E9/L Final    nRBC 01/17/2023 0.0  0.0 - 0.0 /100 WBC Final    RBC 01/17/2023 5.69  4.50 - 5.90 x10E12/L Final    Hemoglobin 01/17/2023 16.1  13.5 - 17.5 g/dL Final    Hematocrit 01/17/2023 49.4  41.0 - 52.0 % Final    MCV 01/17/2023 87  80 - 100 fL Final    MCHC 01/17/2023 32.6  32.0 - 36.0 g/dL Final    Platelets 01/17/2023 223  150 - 450 x10E9/L Final    RDW 01/17/2023 13.4  11.5 - 14.5 % Final    Neutrophils % 01/17/2023 72.0  40.0 - 80.0 % Final    Immature Granulocytes %, Automated 01/17/2023 0.4  0.0 - 0.9 % Final    Comment:  Immature Granulocyte Count (IG) includes promyelocytes,    myelocytes and metamyelocytes but does not include bands.   Percent differential counts (%) should be interpreted in the   context of the  absolute cell counts (cells/L).      Lymphocytes % 01/17/2023 16.8  13.0 - 44.0 % Final    Monocytes % 01/17/2023 8.3  2.0 - 10.0 % Final    Eosinophils % 01/17/2023 1.5  0.0 - 6.0 % Final    Basophils % 01/17/2023 1.0  0.0 - 2.0 % Final    Neutrophils Absolute 01/17/2023 4.94  1.60 - 5.50 x10E9/L Final    Lymphocytes Absolute 01/17/2023 1.15  0.80 - 3.00 x10E9/L Final    Monocytes Absolute 01/17/2023 0.57  0.05 - 0.80 x10E9/L Final    Eosinophils Absolute 01/17/2023 0.10  0.00 - 0.40 x10E9/L Final    Basophils Absolute 01/17/2023 0.07  0.00 - 0.10 x10E9/L Final   Legacy Encounter on 09/28/2022   Component Date Value Ref Range Status    Ventricular Rate 09/28/2022 74  BPM Final    Atrial Rate 09/28/2022 74  BPM Final    AR Interval 09/28/2022 148  ms Final    QRS Duration 09/28/2022 96  ms Final    QT Interval 09/28/2022 394  ms Final    QTC Calculation(Bazett) 09/28/2022 437  ms Final    P Axis 09/28/2022 71  degrees Final    R Axis 09/28/2022 45  degrees Final    T Axis 09/28/2022 61  degrees Final    QRS Count 09/28/2022 12  beats Final    Q Onset 09/28/2022 218  ms Final    P Onset 09/28/2022 144  ms Final    P Offset 09/28/2022 199  ms Final    T Offset 09/28/2022 415  ms Final    QTC Fredericia 09/28/2022 422  ms Final     Current Outpatient Medications on File Prior to Visit   Medication Sig Dispense Refill    amLODIPine (Norvasc) 5 mg tablet TAKE 1 TABLET BY MOUTH EVERY DAY 90 tablet 3    aspirin 81 mg chewable tablet Chew 1 tablet (81 mg) once daily.      atorvastatin (Lipitor) 80 mg tablet Take 1 tablet (80 mg) by mouth once daily.      cholecalciferol (Vitamin D-3) 25 MCG (1000 UT) tablet Take 1 tablet (25 mcg) by mouth once daily.      ciprofloxacin (Ciloxan) 0.3 % ophthalmic solution Administer 1 drop into affected eye(s) 4 times a day.      clopidogrel (Plavix) 75 mg tablet Take 1 tablet (75 mg) by mouth once daily.      famotidine (Pepcid) 40 mg tablet Take 1 tablet (40 mg) by mouth once daily.       finasteride (Proscar) 5 mg tablet TAKE 1 TABLET BY MOUTH EVERY DAY 90 tablet 3    lancets 33 gauge misc Check blood sugars 2 times daily      lisinopril 40 mg tablet Take 0.5 tablets (20 mg) by mouth once daily.      meclizine (Antivert) 25 mg tablet Take 1 tablet (25 mg) by mouth 3 times a day as needed for dizziness.      meloxicam (Mobic) 15 mg tablet Take 1 tablet (15 mg) by mouth.      multivitamin tablet Take 1 tablet by mouth once daily.      nitroglycerin (Nitrostat) 0.4 mg SL tablet Place 1 tablet (0.4 mg) under the tongue.      omega-3 (Fish Oil) 300-1,000 mg capsule Take 1 capsule (1,000 mg) by mouth every 36 hours.      OneTouch Delica Plus Lancet 33 gauge misc CHECK BLOOD SUGARS 2 TIMES DAILY      OneTouch Ultra Test strip USE TO TEST TWICE DAILY AS DIRECTED      OneTouch Ultra2 Meter misc USE TO TEST BLOOD SUGAR TWICE DAILY      prednisoLONE acetate (Pred-Forte) 1 % ophthalmic suspension Administer 1 drop into affected eye(s) 4 times a day.      tamsulosin (Flomax) 0.4 mg 24 hr capsule Take 1 capsule (0.4 mg) by mouth once daily.      omega-3 fatty acids-fish oil (Fish OiL) 340-1,000 mg capsule Take by mouth.       No current facility-administered medications on file prior to visit.     No images are attached to the encounter.            Assessment/Plan   Problem List Items Addressed This Visit       Hypertension (Chronic)    Neuropathy    Pre-diabetes    Vertigo    Age-related incipient cataract of both eyes - Primary    Preop examination

## 2023-09-01 LAB
ANION GAP IN SER/PLAS: 16 MMOL/L (ref 10–20)
CALCIUM (MG/DL) IN SER/PLAS: 10 MG/DL (ref 8.6–10.6)
CARBON DIOXIDE, TOTAL (MMOL/L) IN SER/PLAS: 27 MMOL/L (ref 21–32)
CHLORIDE (MMOL/L) IN SER/PLAS: 106 MMOL/L (ref 98–107)
CREATININE (MG/DL) IN SER/PLAS: 1.09 MG/DL (ref 0.5–1.3)
GFR MALE: 71 ML/MIN/1.73M2
GLUCOSE (MG/DL) IN SER/PLAS: 107 MG/DL (ref 74–99)
POTASSIUM (MMOL/L) IN SER/PLAS: 4.5 MMOL/L (ref 3.5–5.3)
SODIUM (MMOL/L) IN SER/PLAS: 144 MMOL/L (ref 136–145)
UREA NITROGEN (MG/DL) IN SER/PLAS: 15 MG/DL (ref 6–23)

## 2023-09-21 ENCOUNTER — PATIENT OUTREACH (OUTPATIENT)
Dept: CARE COORDINATION | Facility: CLINIC | Age: 73
End: 2023-09-21
Payer: MEDICARE

## 2023-10-19 DIAGNOSIS — I10 ESSENTIAL (PRIMARY) HYPERTENSION: ICD-10-CM

## 2023-10-19 RX ORDER — LISINOPRIL 40 MG/1
40 TABLET ORAL DAILY
Qty: 90 TABLET | Refills: 3 | Status: SHIPPED | OUTPATIENT
Start: 2023-10-19

## 2023-10-20 ENCOUNTER — PATIENT OUTREACH (OUTPATIENT)
Dept: CARE COORDINATION | Facility: CLINIC | Age: 73
End: 2023-10-20
Payer: MEDICARE

## 2023-10-20 NOTE — PROGRESS NOTES
Unable to reach patient for 90 day post discharge follow up call.   M with call back number for patient to call if needed   If no voicemail available call attempts x 2 were made to contact the patient to assist with any questions or concerns patient may have.

## 2023-12-07 DIAGNOSIS — N40.0 BENIGN PROSTATIC HYPERPLASIA, UNSPECIFIED WHETHER LOWER URINARY TRACT SYMPTOMS PRESENT: Primary | ICD-10-CM

## 2023-12-07 RX ORDER — GLUCOSAM/CHONDRO/HERB 149/HYAL 750-100 MG
1 TABLET ORAL DAILY
COMMUNITY
Start: 2019-04-05 | End: 2024-03-04

## 2023-12-07 RX ORDER — TAMSULOSIN HYDROCHLORIDE 0.4 MG/1
0.4 CAPSULE ORAL DAILY
Qty: 90 CAPSULE | Refills: 0 | Status: SHIPPED | OUTPATIENT
Start: 2023-12-07 | End: 2024-03-04 | Stop reason: SDUPTHER

## 2024-01-16 DIAGNOSIS — Z87.19 PERSONAL HISTORY OF OTHER DISEASES OF THE DIGESTIVE SYSTEM: ICD-10-CM

## 2024-01-16 RX ORDER — FAMOTIDINE 40 MG/1
40 TABLET, FILM COATED ORAL NIGHTLY
Qty: 90 TABLET | Refills: 3 | Status: SHIPPED | OUTPATIENT
Start: 2024-01-16

## 2024-02-21 ENCOUNTER — TELEPHONE (OUTPATIENT)
Dept: PRIMARY CARE | Facility: CLINIC | Age: 74
End: 2024-02-21
Payer: MEDICARE

## 2024-02-21 NOTE — TELEPHONE ENCOUNTER
Patient due for an MCRA at any point. Schedule with Martin or Dr. Fowler. Please schedule patient and send this encounter to [facility] for lab orders.       Thank you-  Sesar Martinez CMA  2/21/2024  Practice Supervisor  [facility]

## 2024-03-01 ENCOUNTER — LAB (OUTPATIENT)
Dept: LAB | Facility: LAB | Age: 74
End: 2024-03-01
Payer: MEDICARE

## 2024-03-01 DIAGNOSIS — G60.8 OTHER HEREDITARY AND IDIOPATHIC NEUROPATHIES: Primary | ICD-10-CM

## 2024-03-01 LAB
ENA SS-A AB SER IA-ACNC: <0.2 AI
FOLATE SERPL-MCNC: >24 NG/ML
PROT SERPL-MCNC: 6.2 G/DL (ref 6.4–8.2)
RHEUMATOID FACT SER NEPH-ACNC: <10 IU/ML (ref 0–15)
TSH SERPL-ACNC: 1.96 MIU/L (ref 0.44–3.98)
VIT B12 SERPL-MCNC: 741 PG/ML (ref 211–911)

## 2024-03-01 PROCEDURE — 36415 COLL VENOUS BLD VENIPUNCTURE: CPT

## 2024-03-01 PROCEDURE — 84155 ASSAY OF PROTEIN SERUM: CPT

## 2024-03-01 PROCEDURE — 82746 ASSAY OF FOLIC ACID SERUM: CPT

## 2024-03-01 PROCEDURE — 84443 ASSAY THYROID STIM HORMONE: CPT

## 2024-03-01 PROCEDURE — 84165 PROTEIN E-PHORESIS SERUM: CPT

## 2024-03-01 PROCEDURE — 86320 SERUM IMMUNOELECTROPHORESIS: CPT | Performed by: STUDENT IN AN ORGANIZED HEALTH CARE EDUCATION/TRAINING PROGRAM

## 2024-03-01 PROCEDURE — 84165 PROTEIN E-PHORESIS SERUM: CPT | Performed by: STUDENT IN AN ORGANIZED HEALTH CARE EDUCATION/TRAINING PROGRAM

## 2024-03-01 PROCEDURE — 86235 NUCLEAR ANTIGEN ANTIBODY: CPT

## 2024-03-01 PROCEDURE — 86334 IMMUNOFIX E-PHORESIS SERUM: CPT

## 2024-03-01 PROCEDURE — 86431 RHEUMATOID FACTOR QUANT: CPT

## 2024-03-01 PROCEDURE — 82607 VITAMIN B-12: CPT

## 2024-03-01 PROCEDURE — 86038 ANTINUCLEAR ANTIBODIES: CPT

## 2024-03-03 DIAGNOSIS — N40.0 BENIGN PROSTATIC HYPERPLASIA, UNSPECIFIED WHETHER LOWER URINARY TRACT SYMPTOMS PRESENT: ICD-10-CM

## 2024-03-04 ENCOUNTER — OFFICE VISIT (OUTPATIENT)
Dept: PRIMARY CARE | Facility: CLINIC | Age: 74
End: 2024-03-04
Payer: MEDICARE

## 2024-03-04 VITALS
TEMPERATURE: 97.2 F | HEART RATE: 82 BPM | SYSTOLIC BLOOD PRESSURE: 114 MMHG | DIASTOLIC BLOOD PRESSURE: 70 MMHG | OXYGEN SATURATION: 98 % | WEIGHT: 188 LBS | HEIGHT: 68 IN | BODY MASS INDEX: 28.49 KG/M2

## 2024-03-04 DIAGNOSIS — R73.01 IMPAIRED FASTING GLUCOSE: ICD-10-CM

## 2024-03-04 DIAGNOSIS — G57.93 NEUROPATHY OF BOTH FEET: ICD-10-CM

## 2024-03-04 DIAGNOSIS — I10 HYPERTENSION, UNSPECIFIED TYPE: Chronic | ICD-10-CM

## 2024-03-04 DIAGNOSIS — I21.3 ST ELEVATION MYOCARDIAL INFARCTION (STEMI), UNSPECIFIED ARTERY (MULTI): ICD-10-CM

## 2024-03-04 DIAGNOSIS — I25.10 ARTERIOSCLEROSIS OF CORONARY ARTERY: ICD-10-CM

## 2024-03-04 DIAGNOSIS — E78.5 HYPERLIPIDEMIA, UNSPECIFIED HYPERLIPIDEMIA TYPE: Primary | Chronic | ICD-10-CM

## 2024-03-04 DIAGNOSIS — N40.0 BENIGN PROSTATIC HYPERPLASIA, UNSPECIFIED WHETHER LOWER URINARY TRACT SYMPTOMS PRESENT: ICD-10-CM

## 2024-03-04 DIAGNOSIS — Z12.5 SCREENING PSA (PROSTATE SPECIFIC ANTIGEN): ICD-10-CM

## 2024-03-04 PROBLEM — I30.0 ACUTE IDIOPATHIC PERICARDITIS (HHS-HCC): Status: RESOLVED | Noted: 2023-08-31 | Resolved: 2024-03-04

## 2024-03-04 PROBLEM — Z95.5 PRESENCE OF CORONARY ANGIOPLASTY IMPLANT AND GRAFT: Chronic | Status: RESOLVED | Noted: 2023-08-31 | Resolved: 2024-03-04

## 2024-03-04 PROBLEM — R20.0 NUMBNESS OF TOES: Status: RESOLVED | Noted: 2023-08-31 | Resolved: 2024-03-04

## 2024-03-04 PROBLEM — M47.812 DJD (DEGENERATIVE JOINT DISEASE) OF CERVICAL SPINE: Status: RESOLVED | Noted: 2023-08-31 | Resolved: 2024-03-04

## 2024-03-04 PROBLEM — G62.9 NEUROPATHY: Status: RESOLVED | Noted: 2023-08-31 | Resolved: 2024-03-04

## 2024-03-04 PROBLEM — H35.3131 EARLY STAGE DRY AGE-RELATED MACULAR DEGENERATION OF BOTH EYES: Status: RESOLVED | Noted: 2023-08-31 | Resolved: 2024-03-04

## 2024-03-04 PROBLEM — H61.22 IMPACTED CERUMEN OF LEFT EAR: Status: RESOLVED | Noted: 2023-08-31 | Resolved: 2024-03-04

## 2024-03-04 PROBLEM — M54.2 NECK PAIN: Status: RESOLVED | Noted: 2023-08-31 | Resolved: 2024-03-04

## 2024-03-04 PROBLEM — L29.9 ITCHING: Status: RESOLVED | Noted: 2023-08-31 | Resolved: 2024-03-04

## 2024-03-04 PROBLEM — H43.393 VITREOUS FLOATERS OF BOTH EYES: Status: RESOLVED | Noted: 2023-08-31 | Resolved: 2024-03-04

## 2024-03-04 PROBLEM — K59.00 CONSTIPATION: Status: RESOLVED | Noted: 2023-08-31 | Resolved: 2024-03-04

## 2024-03-04 PROBLEM — R07.2 PRECORDIAL PAIN: Chronic | Status: RESOLVED | Noted: 2023-08-31 | Resolved: 2024-03-04

## 2024-03-04 PROBLEM — H53.002 AMBLYOPIA OF LEFT EYE: Status: RESOLVED | Noted: 2023-08-31 | Resolved: 2024-03-04

## 2024-03-04 PROBLEM — K64.4 EXTERNAL HEMORRHOID: Status: RESOLVED | Noted: 2023-08-31 | Resolved: 2024-03-04

## 2024-03-04 PROBLEM — E11.40 CONTROLLED TYPE 2 DIABETES MELLITUS WITH DIABETIC NEUROPATHY, WITHOUT LONG-TERM CURRENT USE OF INSULIN (MULTI): Status: RESOLVED | Noted: 2023-04-14 | Resolved: 2024-03-04

## 2024-03-04 PROBLEM — M54.30 SCIATICA: Status: RESOLVED | Noted: 2023-08-31 | Resolved: 2024-03-04

## 2024-03-04 PROBLEM — M54.16 LUMBAR RADICULOPATHY: Status: RESOLVED | Noted: 2023-08-31 | Resolved: 2024-03-04

## 2024-03-04 PROBLEM — M25.50 ACHE IN JOINT: Status: RESOLVED | Noted: 2023-08-31 | Resolved: 2024-03-04

## 2024-03-04 PROBLEM — R05.9 COUGH: Status: RESOLVED | Noted: 2023-08-31 | Resolved: 2024-03-04

## 2024-03-04 PROBLEM — E66.9 OBESITY: Status: RESOLVED | Noted: 2023-08-31 | Resolved: 2024-03-04

## 2024-03-04 PROBLEM — Z01.818 PREOP EXAMINATION: Status: RESOLVED | Noted: 2023-08-31 | Resolved: 2024-03-04

## 2024-03-04 PROBLEM — H25.093 AGE-RELATED INCIPIENT CATARACT OF BOTH EYES: Status: RESOLVED | Noted: 2023-08-31 | Resolved: 2024-03-04

## 2024-03-04 PROBLEM — M19.90 ARTHRITIS: Status: RESOLVED | Noted: 2023-04-14 | Resolved: 2024-03-04

## 2024-03-04 PROBLEM — R06.02 SHORTNESS OF BREATH: Status: RESOLVED | Noted: 2023-08-31 | Resolved: 2024-03-04

## 2024-03-04 PROBLEM — R42 VERTIGO: Status: RESOLVED | Noted: 2023-08-31 | Resolved: 2024-03-04

## 2024-03-04 PROBLEM — I31.9 MYOPERICARDITIS (HHS-HCC): Status: RESOLVED | Noted: 2023-04-14 | Resolved: 2024-03-04

## 2024-03-04 PROBLEM — H25.811 COMBINED FORM OF AGE-RELATED CATARACT, RIGHT EYE: Status: RESOLVED | Noted: 2023-08-31 | Resolved: 2024-03-04

## 2024-03-04 LAB — ANA SER QL HEP2 SUBST: NEGATIVE

## 2024-03-04 PROCEDURE — 99213 OFFICE O/P EST LOW 20 MIN: CPT | Performed by: FAMILY MEDICINE

## 2024-03-04 PROCEDURE — 3078F DIAST BP <80 MM HG: CPT | Performed by: FAMILY MEDICINE

## 2024-03-04 PROCEDURE — 1126F AMNT PAIN NOTED NONE PRSNT: CPT | Performed by: FAMILY MEDICINE

## 2024-03-04 PROCEDURE — 1036F TOBACCO NON-USER: CPT | Performed by: FAMILY MEDICINE

## 2024-03-04 PROCEDURE — 3074F SYST BP LT 130 MM HG: CPT | Performed by: FAMILY MEDICINE

## 2024-03-04 PROCEDURE — 1159F MED LIST DOCD IN RCRD: CPT | Performed by: FAMILY MEDICINE

## 2024-03-04 RX ORDER — TAMSULOSIN HYDROCHLORIDE 0.4 MG/1
0.4 CAPSULE ORAL DAILY
Qty: 90 CAPSULE | Refills: 1 | Status: SHIPPED | OUTPATIENT
Start: 2024-03-04

## 2024-03-04 ASSESSMENT — ENCOUNTER SYMPTOMS
ACTIVITY CHANGE: 0
HYPERACTIVE: 0
RESPIRATORY NEGATIVE: 1
CARDIOVASCULAR NEGATIVE: 1
CHILLS: 0
DECREASED CONCENTRATION: 0
LOSS OF SENSATION IN FEET: 1
DEPRESSION: 0
EYES NEGATIVE: 1
OCCASIONAL FEELINGS OF UNSTEADINESS: 0
UNEXPECTED WEIGHT CHANGE: 0
ABDOMINAL DISTENTION: 0
ABDOMINAL PAIN: 0
FATIGUE: 0

## 2024-03-04 ASSESSMENT — PATIENT HEALTH QUESTIONNAIRE - PHQ9
SUM OF ALL RESPONSES TO PHQ9 QUESTIONS 1 AND 2: 0
1. LITTLE INTEREST OR PLEASURE IN DOING THINGS: NOT AT ALL
10. IF YOU CHECKED OFF ANY PROBLEMS, HOW DIFFICULT HAVE THESE PROBLEMS MADE IT FOR YOU TO DO YOUR WORK, TAKE CARE OF THINGS AT HOME, OR GET ALONG WITH OTHER PEOPLE: NOT DIFFICULT AT ALL
2. FEELING DOWN, DEPRESSED OR HOPELESS: NOT AT ALL

## 2024-03-04 NOTE — PATIENT INSTRUCTIONS
Please continue with present regimen.    Lab studies are going to be performed including CMP, CBC, lipids, TSH, hemoglobin A1c, PSA.    Please schedule time for annual wellness visit.    Blood close appears to be under good control.

## 2024-03-04 NOTE — PROGRESS NOTES
"Subjective   Patient ID: Emery Pedraza is a 73 y.o. male who presents for Follow-up (Labs from Dr. Leonard's visit).  .  Saw Dr. Pride    Patient presents for follow-up and had cataract surgery performed and doing great.  Patient has had no troubles with chest pain or shortness of breath needs to have his hemoglobin A1c performed and also get his PSA level performed.  He had no dizziness no lightheadedness blood sugars been under good control.    He has a diagnosis of diabetes in the chart but nobody is ever confirmed where this came from.  Couple times has had some elevation of the glucose levels but there is never been glucose elevation that met the criteria for diabetes diagnosis     April was here for follow up    Having cataract surgery.Did surgery. No headaches or dizziness.         Review of Systems   Constitutional:  Negative for activity change, chills, fatigue and unexpected weight change.   HENT: Negative.     Eyes: Negative.         Much improved with cataract surgery   Respiratory: Negative.     Cardiovascular: Negative.    Gastrointestinal:  Negative for abdominal distention and abdominal pain.   Psychiatric/Behavioral:  Negative for behavioral problems and decreased concentration. The patient is not hyperactive.        Objective   /70   Pulse 82   Temp 36.2 °C (97.2 °F)   Ht 1.715 m (5' 7.5\")   Wt 85.3 kg (188 lb)   SpO2 98%   BMI 29.01 kg/m²   BSA Body surface area is 2.02 meters squared.      Physical Exam  Constitutional:       Appearance: Normal appearance.   HENT:      Head: Normocephalic and atraumatic.   Cardiovascular:      Rate and Rhythm: Normal rate and regular rhythm.      Pulses: Normal pulses.      Heart sounds: Normal heart sounds.   Pulmonary:      Effort: Pulmonary effort is normal. No respiratory distress.   Abdominal:      General: Abdomen is flat.      Palpations: Abdomen is soft.   Neurological:      Mental Status: He is alert.       Lab on 03/01/2024   Component " Date Value Ref Range Status    Thyroid Stimulating Hormone 03/01/2024 1.96  0.44 - 3.98 mIU/L Final    Rheumatoid Factor 03/01/2024 <10  0 - 15 IU/mL Final    Vitamin B12 03/01/2024 741  211 - 911 pg/mL Final    Folate, Serum 03/01/2024 >24.0  >5.0 ng/mL Final    Anti-SSA 03/01/2024 <0.2  <1.0 AI Final    < 1.0 = NEGATIVE  >=1.0 = POSITIVE    Total Protein 03/01/2024 6.2 (L)  6.4 - 8.2 g/dL Final   Lab on 08/31/2023   Component Date Value Ref Range Status    Glucose 08/31/2023 107 (H)  74 - 99 mg/dL Final    Sodium 08/31/2023 144  136 - 145 mmol/L Final    Potassium 08/31/2023 4.5  3.5 - 5.3 mmol/L Final    Chloride 08/31/2023 106  98 - 107 mmol/L Final    Bicarbonate 08/31/2023 27  21 - 32 mmol/L Final    Anion Gap 08/31/2023 16  10 - 20 mmol/L Final    Urea Nitrogen 08/31/2023 15  6 - 23 mg/dL Final    Creatinine 08/31/2023 1.09  0.50 - 1.30 mg/dL Final    GFR MALE 08/31/2023 71  >90 mL/min/1.73m2 Final     CALCULATIONS OF ESTIMATED GFR ARE PERFORMED   USING THE 2021 CKD-EPI STUDY REFIT EQUATION   WITHOUT THE RACE VARIABLE FOR THE IDMS-TRACEABLE   CREATININE METHODS.    https://jasn.asnjournals.org/content/early/2021/09/22/ASN.6305513613    Calcium 08/31/2023 10.0  8.6 - 10.6 mg/dL Final     Current Outpatient Medications on File Prior to Visit   Medication Sig Dispense Refill    amLODIPine (Norvasc) 5 mg tablet TAKE 1 TABLET BY MOUTH EVERY DAY 90 tablet 3    aspirin 81 mg chewable tablet Chew 1 tablet (81 mg) once daily.      atorvastatin (Lipitor) 80 mg tablet Take 1 tablet (80 mg) by mouth once daily.      clopidogrel (Plavix) 75 mg tablet Take 1 tablet (75 mg) by mouth once daily.      famotidine (Pepcid) 40 mg tablet TAKE 1 TABLET BEDTIME 90 tablet 3    finasteride (Proscar) 5 mg tablet TAKE 1 TABLET BY MOUTH EVERY DAY 90 tablet 3    lancets 33 gauge misc Check blood sugars 2 times daily      lisinopril 40 mg tablet TAKE 1 TABLET BY MOUTH EVERY DAY 90 tablet 3    meclizine (Antivert) 25 mg tablet Take 1  tablet (25 mg) by mouth 3 times a day as needed for dizziness.      meloxicam (Mobic) 15 mg tablet Take 1 tablet (15 mg) by mouth.      multivitamin tablet Take 1 tablet by mouth once daily.      nitroglycerin (Nitrostat) 0.4 mg SL tablet Place 1 tablet (0.4 mg) under the tongue.      omega-3 fatty acids-fish oil (Fish OiL) 340-1,000 mg capsule Take by mouth.      OneTouch Delica Plus Lancet 33 gauge misc CHECK BLOOD SUGARS 2 TIMES DAILY      OneTouch Ultra Test strip USE TO TEST TWICE DAILY AS DIRECTED      OneTouch Ultra2 Meter misc USE TO TEST BLOOD SUGAR TWICE DAILY      tamsulosin (Flomax) 0.4 mg 24 hr capsule TAKE 1 CAPSULE BY MOUTH EVERY DAY 90 capsule 0    [DISCONTINUED] cholecalciferol (Vitamin D-3) 25 MCG (1000 UT) tablet Take 1 tablet (25 mcg) by mouth once daily.      [DISCONTINUED] ciprofloxacin (Ciloxan) 0.3 % ophthalmic solution Administer 1 drop into affected eye(s) 4 times a day.      [DISCONTINUED] omega 3-dha-epa-fish oil (Fish OiL) 1,000 mg (120 mg-180 mg) capsule Take 1 capsule (1,000 mg) by mouth once daily.      [DISCONTINUED] omega-3 (Fish Oil) 300-1,000 mg capsule Take 1 capsule (1,000 mg) by mouth every 36 hours.      [DISCONTINUED] prednisoLONE acetate (Pred-Forte) 1 % ophthalmic suspension Administer 1 drop into affected eye(s) 4 times a day.       No current facility-administered medications on file prior to visit.     No images are attached to the encounter.            Assessment/Plan   Problem List Items Addressed This Visit             ICD-10-CM    Hyperlipidemia - Primary (Chronic) E78.5     Checking lipids.         Hypertension (Chronic) I10     Blood pressure is at goal on present regimen         Arteriosclerosis of coronary artery I25.10     Looks well.  Ordering lipids         BPH (benign prostatic hyperplasia) N40.0     Lab studies being performed including PSA level         Neuropathy of both feet G57.93    Impaired fasting glucose R73.01    STEMI (ST elevation myocardial  infarction) (CMS/HCC) I21.3     Please continue with labs.  On statin therapy

## 2024-03-06 LAB
ALBUMIN: 4.1 G/DL (ref 3.4–5)
ALPHA 1 GLOBULIN: 0.3 G/DL (ref 0.2–0.6)
ALPHA 2 GLOBULIN: 0.6 G/DL (ref 0.4–1.1)
BETA GLOBULIN: 0.7 G/DL (ref 0.5–1.2)
GAMMA GLOBULIN: 0.5 G/DL (ref 0.5–1.4)
IMMUNOFIXATION COMMENT: NORMAL
PATH REVIEW - SERUM IMMUNOFIXATION: NORMAL
PATH REVIEW-SERUM PROTEIN ELECTROPHORESIS: NORMAL
PROTEIN ELECTROPHORESIS COMMENT: NORMAL

## 2024-03-25 ENCOUNTER — TELEPHONE (OUTPATIENT)
Dept: SCHEDULING | Age: 74
End: 2024-03-25
Payer: MEDICARE

## 2024-03-25 DIAGNOSIS — E78.5 HYPERLIPIDEMIA, UNSPECIFIED HYPERLIPIDEMIA TYPE: Primary | Chronic | ICD-10-CM

## 2024-03-25 RX ORDER — ATORVASTATIN CALCIUM 80 MG/1
80 TABLET, FILM COATED ORAL DAILY
Qty: 90 TABLET | Refills: 1 | Status: SHIPPED | OUTPATIENT
Start: 2024-03-25

## 2024-06-13 DIAGNOSIS — I10 ESSENTIAL (PRIMARY) HYPERTENSION: ICD-10-CM

## 2024-06-13 RX ORDER — CHLORHEXIDINE GLUCONATE ORAL RINSE 1.2 MG/ML
SOLUTION DENTAL
COMMUNITY
Start: 2024-05-20

## 2024-06-13 RX ORDER — AMLODIPINE BESYLATE 5 MG/1
TABLET ORAL
Qty: 90 TABLET | Refills: 3 | Status: SHIPPED | OUTPATIENT
Start: 2024-06-13

## 2024-06-24 ENCOUNTER — LAB (OUTPATIENT)
Dept: LAB | Facility: LAB | Age: 74
End: 2024-06-24
Payer: MEDICARE

## 2024-06-24 DIAGNOSIS — I10 HYPERTENSION, UNSPECIFIED TYPE: Chronic | ICD-10-CM

## 2024-06-24 DIAGNOSIS — E78.5 HYPERLIPIDEMIA, UNSPECIFIED HYPERLIPIDEMIA TYPE: Chronic | ICD-10-CM

## 2024-06-24 DIAGNOSIS — R73.01 IMPAIRED FASTING GLUCOSE: ICD-10-CM

## 2024-06-24 DIAGNOSIS — Z12.5 SCREENING PSA (PROSTATE SPECIFIC ANTIGEN): ICD-10-CM

## 2024-06-24 LAB
ALBUMIN SERPL BCP-MCNC: 4.4 G/DL (ref 3.4–5)
ALP SERPL-CCNC: 66 U/L (ref 33–136)
ALT SERPL W P-5'-P-CCNC: 19 U/L (ref 10–52)
ANION GAP SERPL CALC-SCNC: 16 MMOL/L (ref 10–20)
AST SERPL W P-5'-P-CCNC: 19 U/L (ref 9–39)
BASOPHILS # BLD AUTO: 0.1 X10*3/UL (ref 0–0.1)
BASOPHILS NFR BLD AUTO: 1.6 %
BILIRUB SERPL-MCNC: 0.9 MG/DL (ref 0–1.2)
BUN SERPL-MCNC: 18 MG/DL (ref 6–23)
CALCIUM SERPL-MCNC: 9.3 MG/DL (ref 8.6–10.6)
CHLORIDE SERPL-SCNC: 107 MMOL/L (ref 98–107)
CHOLEST SERPL-MCNC: 145 MG/DL (ref 0–199)
CHOLESTEROL/HDL RATIO: 2.8
CO2 SERPL-SCNC: 22 MMOL/L (ref 21–32)
CREAT SERPL-MCNC: 1.09 MG/DL (ref 0.5–1.3)
CREAT UR-MCNC: 43.3 MG/DL (ref 20–370)
EGFRCR SERPLBLD CKD-EPI 2021: 70 ML/MIN/1.73M*2
EOSINOPHIL # BLD AUTO: 0.14 X10*3/UL (ref 0–0.4)
EOSINOPHIL NFR BLD AUTO: 2.3 %
ERYTHROCYTE [DISTWIDTH] IN BLOOD BY AUTOMATED COUNT: 13 % (ref 11.5–14.5)
EST. AVERAGE GLUCOSE BLD GHB EST-MCNC: 111 MG/DL
GLUCOSE SERPL-MCNC: 101 MG/DL (ref 74–99)
HBA1C MFR BLD: 5.5 %
HCT VFR BLD AUTO: 48.7 % (ref 41–52)
HDLC SERPL-MCNC: 52.4 MG/DL
HGB BLD-MCNC: 16.1 G/DL (ref 13.5–17.5)
IMM GRANULOCYTES # BLD AUTO: 0.02 X10*3/UL (ref 0–0.5)
IMM GRANULOCYTES NFR BLD AUTO: 0.3 % (ref 0–0.9)
LDLC SERPL CALC-MCNC: 69 MG/DL
LYMPHOCYTES # BLD AUTO: 0.98 X10*3/UL (ref 0.8–3)
LYMPHOCYTES NFR BLD AUTO: 16 %
MCH RBC QN AUTO: 29.5 PG (ref 26–34)
MCHC RBC AUTO-ENTMCNC: 33.1 G/DL (ref 32–36)
MCV RBC AUTO: 89 FL (ref 80–100)
MICROALBUMIN UR-MCNC: <7 MG/L
MICROALBUMIN/CREAT UR: NORMAL MG/G{CREAT}
MONOCYTES # BLD AUTO: 0.48 X10*3/UL (ref 0.05–0.8)
MONOCYTES NFR BLD AUTO: 7.9 %
NEUTROPHILS # BLD AUTO: 4.39 X10*3/UL (ref 1.6–5.5)
NEUTROPHILS NFR BLD AUTO: 71.9 %
NON HDL CHOLESTEROL: 93 MG/DL (ref 0–149)
NRBC BLD-RTO: 0 /100 WBCS (ref 0–0)
PLATELET # BLD AUTO: 226 X10*3/UL (ref 150–450)
POTASSIUM SERPL-SCNC: 4.5 MMOL/L (ref 3.5–5.3)
PROT SERPL-MCNC: 6.4 G/DL (ref 6.4–8.2)
PSA SERPL-MCNC: 1.72 NG/ML
RBC # BLD AUTO: 5.46 X10*6/UL (ref 4.5–5.9)
SODIUM SERPL-SCNC: 140 MMOL/L (ref 136–145)
TRIGL SERPL-MCNC: 117 MG/DL (ref 0–149)
TSH SERPL-ACNC: 1.64 MIU/L (ref 0.44–3.98)
VLDL: 23 MG/DL (ref 0–40)
WBC # BLD AUTO: 6.1 X10*3/UL (ref 4.4–11.3)

## 2024-06-24 PROCEDURE — 82570 ASSAY OF URINE CREATININE: CPT

## 2024-06-24 PROCEDURE — G0103 PSA SCREENING: HCPCS

## 2024-06-24 PROCEDURE — 80053 COMPREHEN METABOLIC PANEL: CPT

## 2024-06-24 PROCEDURE — 80061 LIPID PANEL: CPT

## 2024-06-24 PROCEDURE — 82043 UR ALBUMIN QUANTITATIVE: CPT

## 2024-06-24 PROCEDURE — 83036 HEMOGLOBIN GLYCOSYLATED A1C: CPT

## 2024-06-24 PROCEDURE — 36415 COLL VENOUS BLD VENIPUNCTURE: CPT

## 2024-06-24 PROCEDURE — 84443 ASSAY THYROID STIM HORMONE: CPT

## 2024-06-24 PROCEDURE — 85025 COMPLETE CBC W/AUTO DIFF WBC: CPT

## 2024-06-25 PROBLEM — H43.399 VITREOUS FLOATERS: Status: ACTIVE | Noted: 2024-06-25

## 2024-06-25 PROBLEM — R07.9 CHEST PAIN: Status: ACTIVE | Noted: 2024-06-25

## 2024-06-25 PROBLEM — G60.8 IDIOPATHIC SMALL FIBER SENSORY NEUROPATHY: Status: ACTIVE | Noted: 2024-06-25

## 2024-06-25 PROBLEM — G57.90 NEUROPATHY OF LOWER EXTREMITY: Status: ACTIVE | Noted: 2024-06-25

## 2024-06-28 ENCOUNTER — APPOINTMENT (OUTPATIENT)
Dept: PRIMARY CARE | Facility: CLINIC | Age: 74
End: 2024-06-28
Payer: MEDICARE

## 2024-06-28 VITALS
SYSTOLIC BLOOD PRESSURE: 130 MMHG | WEIGHT: 187 LBS | HEART RATE: 89 BPM | OXYGEN SATURATION: 97 % | TEMPERATURE: 97.8 F | HEIGHT: 69 IN | DIASTOLIC BLOOD PRESSURE: 82 MMHG | BODY MASS INDEX: 27.7 KG/M2

## 2024-06-28 DIAGNOSIS — G57.93 NEUROPATHY OF BOTH FEET: ICD-10-CM

## 2024-06-28 DIAGNOSIS — Z00.00 ENCOUNTER FOR ANNUAL WELLNESS VISIT (AWV) IN MEDICARE PATIENT: Primary | ICD-10-CM

## 2024-06-28 DIAGNOSIS — E78.5 HYPERLIPIDEMIA, UNSPECIFIED HYPERLIPIDEMIA TYPE: Chronic | ICD-10-CM

## 2024-06-28 DIAGNOSIS — Z71.89 CARDIAC RISK COUNSELING: ICD-10-CM

## 2024-06-28 DIAGNOSIS — G57.90 NEUROPATHY OF LOWER EXTREMITY, UNSPECIFIED LATERALITY: ICD-10-CM

## 2024-06-28 DIAGNOSIS — R73.01 IMPAIRED FASTING GLUCOSE: ICD-10-CM

## 2024-06-28 DIAGNOSIS — N40.1 BENIGN PROSTATIC HYPERPLASIA WITH LOWER URINARY TRACT SYMPTOMS, SYMPTOM DETAILS UNSPECIFIED: ICD-10-CM

## 2024-06-28 DIAGNOSIS — I10 HYPERTENSION, UNSPECIFIED TYPE: Chronic | ICD-10-CM

## 2024-06-28 DIAGNOSIS — Z71.89 ADVANCE DIRECTIVE DISCUSSED WITH PATIENT: ICD-10-CM

## 2024-06-28 DIAGNOSIS — I21.3 ST ELEVATION MYOCARDIAL INFARCTION (STEMI), UNSPECIFIED ARTERY (MULTI): ICD-10-CM

## 2024-06-28 DIAGNOSIS — Z00.00 ROUTINE GENERAL MEDICAL EXAMINATION AT HEALTH CARE FACILITY: ICD-10-CM

## 2024-06-28 DIAGNOSIS — M65.30 TRIGGER FINGER, UNSPECIFIED FINGER, UNSPECIFIED LATERALITY: ICD-10-CM

## 2024-06-28 DIAGNOSIS — Z96.652 STATUS POST TOTAL LEFT KNEE REPLACEMENT: ICD-10-CM

## 2024-06-28 DIAGNOSIS — I25.10 ARTERIOSCLEROSIS OF CORONARY ARTERY: ICD-10-CM

## 2024-06-28 PROBLEM — R73.03 PRE-DIABETES: Status: RESOLVED | Noted: 2023-08-31 | Resolved: 2024-06-28

## 2024-06-28 PROBLEM — I24.9 ACS (ACUTE CORONARY SYNDROME) (MULTI): Status: RESOLVED | Noted: 2023-04-05 | Resolved: 2024-06-28

## 2024-06-28 PROBLEM — H43.399 VITREOUS FLOATERS: Status: RESOLVED | Noted: 2024-06-25 | Resolved: 2024-06-28

## 2024-06-28 PROBLEM — R07.9 CHEST PAIN: Status: RESOLVED | Noted: 2024-06-25 | Resolved: 2024-06-28

## 2024-06-28 PROBLEM — R41.3 MEMORY CHANGES: Status: RESOLVED | Noted: 2023-08-31 | Resolved: 2024-06-28

## 2024-06-28 RX ORDER — NITROGLYCERIN 0.4 MG/1
0.4 TABLET SUBLINGUAL EVERY 5 MIN PRN
Qty: 90 TABLET | Refills: 1 | Status: SHIPPED | OUTPATIENT
Start: 2024-06-28

## 2024-06-28 ASSESSMENT — ENCOUNTER SYMPTOMS
NERVOUS/ANXIOUS: 0
SINUS PRESSURE: 0
EYE ITCHING: 0
CHEST TIGHTNESS: 0
TROUBLE SWALLOWING: 0
AGITATION: 0
BRUISES/BLEEDS EASILY: 0
ADENOPATHY: 0
POLYDIPSIA: 0
DIAPHORESIS: 0
DIFFICULTY URINATING: 0
NAUSEA: 0
CONFUSION: 0
FREQUENCY: 0
COUGH: 0
CONSTIPATION: 0
DIZZINESS: 0
CARDIOVASCULAR NEGATIVE: 1
WOUND: 0
ABDOMINAL PAIN: 0
HALLUCINATIONS: 0
ANAL BLEEDING: 0
BACK PAIN: 0
UNEXPECTED WEIGHT CHANGE: 0
CHILLS: 0
ARTHRALGIAS: 0
ABDOMINAL DISTENTION: 0
SEIZURES: 0
SORE THROAT: 0
DEPRESSION: 0
GASTROINTESTINAL NEGATIVE: 1
FLANK PAIN: 0
SLEEP DISTURBANCE: 0
OCCASIONAL FEELINGS OF UNSTEADINESS: 0
BLOOD IN STOOL: 0
PALPITATIONS: 0
EYE REDNESS: 0
EYE PAIN: 0
DYSURIA: 0
EYE DISCHARGE: 0
LIGHT-HEADEDNESS: 0
NUMBNESS: 0
FACIAL ASYMMETRY: 0
DECREASED CONCENTRATION: 0
FATIGUE: 0
MYALGIAS: 0
APPETITE CHANGE: 0
VOMITING: 0
LOSS OF SENSATION IN FEET: 1
SPEECH DIFFICULTY: 0
VOICE CHANGE: 0
HEADACHES: 0
SINUS PAIN: 0
DIARRHEA: 0

## 2024-06-28 ASSESSMENT — ACTIVITIES OF DAILY LIVING (ADL)
TAKING_MEDICATION: INDEPENDENT
MANAGING_FINANCES: INDEPENDENT
DRESSING: INDEPENDENT
BATHING: INDEPENDENT
DOING_HOUSEWORK: INDEPENDENT
GROCERY_SHOPPING: INDEPENDENT

## 2024-06-28 ASSESSMENT — PATIENT HEALTH QUESTIONNAIRE - PHQ9
2. FEELING DOWN, DEPRESSED OR HOPELESS: NOT AT ALL
1. LITTLE INTEREST OR PLEASURE IN DOING THINGS: NOT AT ALL
SUM OF ALL RESPONSES TO PHQ9 QUESTIONS 1 AND 2: 0
10. IF YOU CHECKED OFF ANY PROBLEMS, HOW DIFFICULT HAVE THESE PROBLEMS MADE IT FOR YOU TO DO YOUR WORK, TAKE CARE OF THINGS AT HOME, OR GET ALONG WITH OTHER PEOPLE: NOT DIFFICULT AT ALL

## 2024-06-28 NOTE — PROGRESS NOTES
Advance Care Planning Note     Discussion Date: 06/28/24   Discussion Participants: patient    The patient wishes to discuss Advance Care Planning today and the following is a brief summary of our discussion.     Patient has capacity to make their own medical decisions: Yes  Health Care Agent/Surrogate Decision Maker documented in chart: Yes    Documents on file and valid:  Advance Directive/Living Will: No   Health Care Power of : No  Other: discussed and code status updated  Full code  Communication of Medical Status/Prognosis:   good    Communication of Treatment Goals/Options:   good    Treatment Decisions  Goals of Care: survival is prioritized, if goals for quality or survival can reasonably be achieved   agree  Follow Up Plan  Discuss next year  Team Members  PCP  Time Statement: Total face to face time spent on advance care planning was 16 minutes with 16 minutes spent in counseling, including the explanation.    Radha Looney ASCVD Risk score (Jean BURTON, et al., 2019) failed to calculate for the following reasons:    The patient has a prior MI or stroke diagnosis  Time spent was 10 mins reviewingSubjective   Patient ID: Emery Pedraza is a 74 y.o. male who presents for Medicare Annual Wellness Visit Subsequent.    Feeling well.  Patient presents for follow-up    Doing well.  Needs refill on nitroglycerin.  Occasional trigger finger.  Would like to see orthopedics for this.    Patient had some pain in the left shoulder has had history of left shoulder impingement    No troubles with headache or double vision or blurry vision no troubles with sore throat or difficulty swallowing no abdominal pain or discomfort no swelling of the legs or feet.  No fever no chills no night sweats      Dr.Lanzinger lluvia zuniga    Patient also is going to be following up with cardiology.  Patient with history of myopericarditis.    Going to see specialist to see if colchicine might not be a good option for  long-term preventative therapy         Alcohol intake: none  Caffeine intake: 1 cup a day  Exercise: walking,  stairs    Last Colonoscopy: 2019,  Seeing for EGD and Colonoscopy  Last Pap smear: N/A  Mammogram:N/A  Last Dexa scan:N/A    Shingles vaccine: utd  TdaP vaccine:     Review of Systems   Constitutional:  Negative for appetite change, chills, diaphoresis, fatigue and unexpected weight change.   HENT:  Negative for congestion, dental problem, ear discharge, ear pain, hearing loss, mouth sores, nosebleeds, postnasal drip, sinus pressure, sinus pain, sore throat, tinnitus, trouble swallowing and voice change.    Eyes:  Negative for pain, discharge, redness, itching and visual disturbance.   Respiratory:  Negative for cough and chest tightness.    Cardiovascular: Negative.  Negative for chest pain, palpitations and leg swelling.        Seeing Jesse 8/7   Gastrointestinal: Negative.  Negative for abdominal distention, abdominal pain, anal bleeding, blood in stool, constipation, diarrhea, nausea and vomiting.   Endocrine: Negative for cold intolerance, heat intolerance, polydipsia and polyuria.   Genitourinary:  Negative for difficulty urinating, dysuria, enuresis, flank pain, frequency, penile discharge, penile swelling and testicular pain.   Musculoskeletal:  Negative for arthralgias, back pain and myalgias.        Has issues with trigger finger   Skin:  Negative for rash and wound.   Allergic/Immunologic: Negative for environmental allergies, food allergies and immunocompromised state.   Neurological:  Negative for dizziness, seizures, facial asymmetry, speech difficulty, light-headedness, numbness and headaches.   Hematological:  Negative for adenopathy. Does not bruise/bleed easily.   Psychiatric/Behavioral:  Negative for agitation, behavioral problems, confusion, decreased concentration, hallucinations, sleep disturbance and suicidal ideas. The patient is not nervous/anxious.        Objective   /82   " Pulse 89   Temp 36.6 °C (97.8 °F)   Ht 1.74 m (5' 8.5\")   Wt 84.8 kg (187 lb)   SpO2 97%   BMI 28.02 kg/m²   BSA Body surface area is 2.02 meters squared.      Physical Exam  Constitutional:       General: He is not in acute distress.     Appearance: Normal appearance. He is not ill-appearing or toxic-appearing.   HENT:      Head: Normocephalic.      Right Ear: Tympanic membrane normal.      Left Ear: Tympanic membrane normal.      Nose: Nose normal.   Eyes:      Extraocular Movements: Extraocular movements intact.      Conjunctiva/sclera: Conjunctivae normal.      Pupils: Pupils are equal, round, and reactive to light.   Cardiovascular:      Rate and Rhythm: Normal rate and regular rhythm.      Pulses: Normal pulses.      Heart sounds: Normal heart sounds.   Pulmonary:      Effort: Pulmonary effort is normal.      Breath sounds: Normal breath sounds. No wheezing or rhonchi.   Abdominal:      General: Abdomen is flat. Bowel sounds are normal. There is no distension.      Palpations: Abdomen is soft.      Tenderness: There is no abdominal tenderness. There is no right CVA tenderness or rebound.      Hernia: No hernia is present.   Genitourinary:     Penis: Normal.       Testes: Normal.      Prostate: Normal.      Comments: Slight enlargement without nodularity  Musculoskeletal:         General: Normal range of motion.      Cervical back: Normal range of motion.      Right lower leg: No edema.   Skin:     General: Skin is warm and dry.      Capillary Refill: Capillary refill takes less than 2 seconds.      Findings: No bruising.   Neurological:      General: No focal deficit present.      Mental Status: He is alert and oriented to person, place, and time.      Cranial Nerves: No cranial nerve deficit.      Sensory: No sensory deficit.      Motor: No weakness.      Coordination: Coordination normal.      Gait: Gait normal.      Deep Tendon Reflexes: Reflexes normal.   Psychiatric:         Mood and Affect: Mood " normal.         Behavior: Behavior normal.       Dorsalis pedis pulses are strong monofilament testing intact  Lab on 06/24/2024   Component Date Value Ref Range Status    WBC 06/24/2024 6.1  4.4 - 11.3 x10*3/uL Final    nRBC 06/24/2024 0.0  0.0 - 0.0 /100 WBCs Final    RBC 06/24/2024 5.46  4.50 - 5.90 x10*6/uL Final    Hemoglobin 06/24/2024 16.1  13.5 - 17.5 g/dL Final    Hematocrit 06/24/2024 48.7  41.0 - 52.0 % Final    MCV 06/24/2024 89  80 - 100 fL Final    MCH 06/24/2024 29.5  26.0 - 34.0 pg Final    MCHC 06/24/2024 33.1  32.0 - 36.0 g/dL Final    RDW 06/24/2024 13.0  11.5 - 14.5 % Final    Platelets 06/24/2024 226  150 - 450 x10*3/uL Final    Neutrophils % 06/24/2024 71.9  40.0 - 80.0 % Final    Immature Granulocytes %, Automated 06/24/2024 0.3  0.0 - 0.9 % Final    Immature Granulocyte Count (IG) includes promyelocytes, myelocytes and metamyelocytes but does not include bands. Percent differential counts (%) should be interpreted in the context of the absolute cell counts (cells/UL).    Lymphocytes % 06/24/2024 16.0  13.0 - 44.0 % Final    Monocytes % 06/24/2024 7.9  2.0 - 10.0 % Final    Eosinophils % 06/24/2024 2.3  0.0 - 6.0 % Final    Basophils % 06/24/2024 1.6  0.0 - 2.0 % Final    Neutrophils Absolute 06/24/2024 4.39  1.60 - 5.50 x10*3/uL Final    Percent differential counts (%) should be interpreted in the context of the absolute cell counts (cells/uL).    Immature Granulocytes Absolute, Au* 06/24/2024 0.02  0.00 - 0.50 x10*3/uL Final    Lymphocytes Absolute 06/24/2024 0.98  0.80 - 3.00 x10*3/uL Final    Monocytes Absolute 06/24/2024 0.48  0.05 - 0.80 x10*3/uL Final    Eosinophils Absolute 06/24/2024 0.14  0.00 - 0.40 x10*3/uL Final    Basophils Absolute 06/24/2024 0.10  0.00 - 0.10 x10*3/uL Final    Glucose 06/24/2024 101 (H)  74 - 99 mg/dL Final    Sodium 06/24/2024 140  136 - 145 mmol/L Final    Potassium 06/24/2024 4.5  3.5 - 5.3 mmol/L Final    Chloride 06/24/2024 107  98 - 107 mmol/L Final     Bicarbonate 06/24/2024 22  21 - 32 mmol/L Final    Anion Gap 06/24/2024 16  10 - 20 mmol/L Final    Urea Nitrogen 06/24/2024 18  6 - 23 mg/dL Final    Creatinine 06/24/2024 1.09  0.50 - 1.30 mg/dL Final    eGFR 06/24/2024 70  >60 mL/min/1.73m*2 Final    Calculations of estimated GFR are performed using the 2021 CKD-EPI Study Refit equation without the race variable for the IDMS-Traceable creatinine methods.  https://jasn.asnjournals.org/content/early/2021/09/22/ASN.9048332815    Calcium 06/24/2024 9.3  8.6 - 10.6 mg/dL Final    Albumin 06/24/2024 4.4  3.4 - 5.0 g/dL Final    Alkaline Phosphatase 06/24/2024 66  33 - 136 U/L Final    Total Protein 06/24/2024 6.4  6.4 - 8.2 g/dL Final    AST 06/24/2024 19  9 - 39 U/L Final    Bilirubin, Total 06/24/2024 0.9  0.0 - 1.2 mg/dL Final    ALT 06/24/2024 19  10 - 52 U/L Final    Patients treated with Sulfasalazine may generate falsely decreased results for ALT.    Cholesterol 06/24/2024 145  0 - 199 mg/dL Final          Age      Desirable   Borderline High   High     0-19 Y     0 - 169       170 - 199     >/= 200    20-24 Y     0 - 189       190 - 224     >/= 225         >24 Y     0 - 199       200 - 239     >/= 240   **All ranges are based on fasting samples. Specific   therapeutic targets will vary based on patient-specific   cardiac risk.    Pediatric guidelines reference:Pediatrics 2011, 128(S5).Adult guidelines reference: NCEP ATPIII Guidelines,MARSHALL 2001, 258:2486-97    Venipuncture immediately after or during the administration of Metamizole may lead to falsely low results. Testing should be performed immediately prior to Metamizole dosing.    HDL-Cholesterol 06/24/2024 52.4  mg/dL Final      Age       Very Low   Low     Normal    High    0-19 Y    < 35      < 40     40-45     ----  20-24 Y    ----     < 40      >45      ----        >24 Y      ----     < 40     40-60      >60      Cholesterol/HDL Ratio 06/24/2024 2.8   Final      Ref Values  Desirable  < 3.4  High Risk   > 5.0    LDL Calculated 06/24/2024 69  <=99 mg/dL Final                                Near   Borderline      AGE      Desirable  Optimal    High     High     Very High     0-19 Y     0 - 109     ---    110-129   >/= 130     ----    20-24 Y     0 - 119     ---    120-159   >/= 160     ----      >24 Y     0 -  99   100-129  130-159   160-189     >/=190      VLDL 06/24/2024 23  0 - 40 mg/dL Final    Triglycerides 06/24/2024 117  0 - 149 mg/dL Final       Age         Desirable   Borderline High   High     Very High   0 D-90 D    19 - 174         ----         ----        ----  91 D- 9 Y     0 -  74        75 -  99     >/= 100      ----    10-19 Y     0 -  89        90 - 129     >/= 130      ----    20-24 Y     0 - 114       115 - 149     >/= 150      ----         >24 Y     0 - 149       150 - 199    200- 499    >/= 500    Venipuncture immediately after or during the administration of Metamizole may lead to falsely low results. Testing should be performed immediately prior to Metamizole dosing.    Non HDL Cholesterol 06/24/2024 93  0 - 149 mg/dL Final          Age       Desirable   Borderline High   High     Very High     0-19 Y     0 - 119       120 - 144     >/= 145    >/= 160    20-24 Y     0 - 149       150 - 189     >/= 190      ----         >24 Y    30 mg/dL above LDL Cholesterol goal      Thyroid Stimulating Hormone 06/24/2024 1.64  0.44 - 3.98 mIU/L Final    Prostate Specific Antigen,Screen 06/24/2024 1.72  <=4.00 ng/mL Final    Hemoglobin A1C 06/24/2024 5.5  see below % Final    Estimated Average Glucose 06/24/2024 111  Not Established mg/dL Final    Albumin, Urine Random 06/24/2024 <7.0  Not established mg/L Final    Creatinine, Urine Random 06/24/2024 43.3  20.0 - 370.0 mg/dL Final    Albumin/Creatinine Ratio 06/24/2024    Final    One or more analytes used in this calculation is outside of the analytical measurement range. Calculation cannot be performed.   Lab on 03/01/2024   Component Date Value Ref Range  Status    Thyroid Stimulating Hormone 03/01/2024 1.96  0.44 - 3.98 mIU/L Final    Rheumatoid Factor 03/01/2024 <10  0 - 15 IU/mL Final    Vitamin B12 03/01/2024 741  211 - 911 pg/mL Final    Folate, Serum 03/01/2024 >24.0  >5.0 ng/mL Final    Anti-SSA 03/01/2024 <0.2  <1.0 AI Final    < 1.0 = NEGATIVE  >=1.0 = POSITIVE    JACINTO 03/01/2024 Negative  Negative Final    The Antinuclear Antibody (JACINTO) test was performed using  indirect immunofluorescence assay with HEp-2 cells slide.    Total Protein 03/01/2024 6.2 (L)  6.4 - 8.2 g/dL Final    Albumin 03/01/2024 4.1  3.4 - 5.0 g/dL Final    Alpha 1 Globulin 03/01/2024 0.3  0.2 - 0.6 g/dL Final    Alpha 2 Globulin 03/01/2024 0.6  0.4 - 1.1 g/dL Final    Beta Globulin 03/01/2024 0.7  0.5 - 1.2 g/dL Final    Gamma 03/01/2024 0.5  0.5 - 1.4 g/dL Final    Protein Electrophoresis Comment 03/01/2024 Normal.   Final    Immunofixation Comment 03/01/2024 No monoclonal protein detected by immunofixation.   Final    Path Review - Serum Protein Electr* 03/01/2024 Reviewed and approved by AICHA KAYE on 3/6/24 at 9:39 PM.       Final    Path Review - Serum Immunofixation 03/01/2024 Reviewed and approved by AICHA KAYE on 3/6/24 at 9:39 PM.       Final   Lab on 08/31/2023   Component Date Value Ref Range Status    Glucose 08/31/2023 107 (H)  74 - 99 mg/dL Final    Sodium 08/31/2023 144  136 - 145 mmol/L Final    Potassium 08/31/2023 4.5  3.5 - 5.3 mmol/L Final    Chloride 08/31/2023 106  98 - 107 mmol/L Final    Bicarbonate 08/31/2023 27  21 - 32 mmol/L Final    Anion Gap 08/31/2023 16  10 - 20 mmol/L Final    Urea Nitrogen 08/31/2023 15  6 - 23 mg/dL Final    Creatinine 08/31/2023 1.09  0.50 - 1.30 mg/dL Final    GFR MALE 08/31/2023 71  >90 mL/min/1.73m2 Final     CALCULATIONS OF ESTIMATED GFR ARE PERFORMED   USING THE 2021 CKD-EPI STUDY REFIT EQUATION   WITHOUT THE RACE VARIABLE FOR THE IDMS-TRACEABLE   CREATININE  METHODS.    https://jasn.asnjournals.org/content/early/2021/09/22/ASN.1539569604    Calcium 08/31/2023 10.0  8.6 - 10.6 mg/dL Final     Current Outpatient Medications on File Prior to Visit   Medication Sig Dispense Refill    amLODIPine (Norvasc) 5 mg tablet TAKE 1 TABLET BY MOUTH EVERY DAY 90 tablet 3    aspirin 81 mg chewable tablet Chew 1 tablet (81 mg) once daily.      atorvastatin (Lipitor) 80 mg tablet Take 1 tablet (80 mg) by mouth once daily. 90 tablet 1    chlorhexidine (Peridex) 0.12 % solution RINSE 15 ML'S TWICE DAILY AFTER BREAKFAST/BEFORE BEDTIME FOLLOWING BRUSHING AND FLOSSING, AND SPIT      famotidine (Pepcid) 40 mg tablet TAKE 1 TABLET BEDTIME 90 tablet 3    finasteride (Proscar) 5 mg tablet TAKE 1 TABLET BY MOUTH EVERY DAY 90 tablet 3    lancets 33 gauge misc Check blood sugars 2 times daily      lisinopril 40 mg tablet TAKE 1 TABLET BY MOUTH EVERY DAY 90 tablet 3    meclizine (Antivert) 25 mg tablet Take 1 tablet (25 mg) by mouth 3 times a day as needed for dizziness.      meloxicam (Mobic) 15 mg tablet Take 1 tablet (15 mg) by mouth.      multivitamin tablet Take 1 tablet by mouth once daily.      nitroglycerin (Nitrostat) 0.4 mg SL tablet Place 1 tablet (0.4 mg) under the tongue.      omega-3 fatty acids-fish oil (Fish OiL) 340-1,000 mg capsule Take by mouth.      OneTouch Delica Plus Lancet 33 gauge misc CHECK BLOOD SUGARS 2 TIMES DAILY      OneTouch Ultra Test strip USE TO TEST TWICE DAILY AS DIRECTED      OneTouch Ultra2 Meter misc USE TO TEST BLOOD SUGAR TWICE DAILY      tamsulosin (Flomax) 0.4 mg 24 hr capsule TAKE 1 CAPSULE BY MOUTH EVERY DAY 90 capsule 1    tamsulosin (Flomax) 0.4 mg 24 hr capsule Take 1 capsule (0.4 mg) by mouth once daily. 90 capsule 1    [DISCONTINUED] clopidogrel (Plavix) 75 mg tablet Take 1 tablet (75 mg) by mouth once daily.       No current facility-administered medications on file prior to visit.     No images are attached to the encounter.             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

## 2024-06-28 NOTE — PATIENT INSTRUCTIONS
Doing well.    Going to have you see hand specialist regarding trigger finger.    Labs have been reviewed with you today.  Medications reviewed and reconciled    Please follow-up with cardiology as directed    Would like to have follow-up with you in 6 months.    Medications reviewed and reconciled labs have been reviewed

## 2024-07-14 DIAGNOSIS — N40.0 BENIGN PROSTATIC HYPERPLASIA WITHOUT LOWER URINARY TRACT SYMPTOMS: ICD-10-CM

## 2024-07-15 RX ORDER — FINASTERIDE 5 MG/1
5 TABLET, FILM COATED ORAL DAILY
Qty: 90 TABLET | Refills: 3 | Status: SHIPPED | OUTPATIENT
Start: 2024-07-15

## 2024-07-20 DIAGNOSIS — I25.10 ARTERIOSCLEROSIS OF CORONARY ARTERY: ICD-10-CM

## 2024-07-22 RX ORDER — NITROGLYCERIN 0.4 MG/1
0.4 TABLET SUBLINGUAL EVERY 5 MIN PRN
Qty: 300 TABLET | Refills: 1 | Status: SHIPPED | OUTPATIENT
Start: 2024-07-22

## 2024-08-02 ENCOUNTER — APPOINTMENT (OUTPATIENT)
Dept: CARDIOLOGY | Facility: CLINIC | Age: 74
End: 2024-08-02
Payer: MEDICARE

## 2024-08-02 VITALS
DIASTOLIC BLOOD PRESSURE: 82 MMHG | OXYGEN SATURATION: 98 % | SYSTOLIC BLOOD PRESSURE: 160 MMHG | BODY MASS INDEX: 28.17 KG/M2 | WEIGHT: 188 LBS | HEART RATE: 78 BPM | RESPIRATION RATE: 16 BRPM

## 2024-08-02 DIAGNOSIS — Z95.5 S/P CORONARY ARTERY STENT PLACEMENT: ICD-10-CM

## 2024-08-02 DIAGNOSIS — I25.2 HISTORY OF ST ELEVATION MYOCARDIAL INFARCTION (STEMI): ICD-10-CM

## 2024-08-02 DIAGNOSIS — I10 HYPERTENSION, UNSPECIFIED TYPE: ICD-10-CM

## 2024-08-02 DIAGNOSIS — I25.10 ARTERIOSCLEROSIS OF CORONARY ARTERY: Primary | ICD-10-CM

## 2024-08-02 DIAGNOSIS — E78.5 HYPERLIPIDEMIA, UNSPECIFIED HYPERLIPIDEMIA TYPE: Chronic | ICD-10-CM

## 2024-08-02 RX ORDER — METOPROLOL SUCCINATE 25 MG/1
25 TABLET, EXTENDED RELEASE ORAL DAILY
Qty: 90 TABLET | Refills: 3 | Status: SHIPPED | OUTPATIENT
Start: 2024-08-02 | End: 2025-08-02

## 2024-08-02 ASSESSMENT — ENCOUNTER SYMPTOMS
MUSCULOSKELETAL NEGATIVE: 1
ENDOCRINE NEGATIVE: 1
CONSTITUTIONAL NEGATIVE: 1
GASTROINTESTINAL NEGATIVE: 1
EYES NEGATIVE: 1
NEAR-SYNCOPE: 0
ALLERGIC/IMMUNOLOGIC NEGATIVE: 1
DYSPNEA ON EXERTION: 0
NEUROLOGICAL NEGATIVE: 1
PND: 0
HEMATOLOGIC/LYMPHATIC NEGATIVE: 1
ORTHOPNEA: 0
PSYCHIATRIC NEGATIVE: 1
RESPIRATORY NEGATIVE: 1
SYNCOPE: 0
PALPITATIONS: 0

## 2024-08-02 NOTE — PROGRESS NOTES
[facility] Cardiology Outpatient Follow-up Visit     Reason for Visit: [facility]    HPI: Emery Pedraza is a 74 y.o.  male who presents today for a follow-up visit. Pst medical history of CAD s/p PCI to LAD (2014) s/p D1 stenting, HTN, DLD, Type II DM c/b neuropathy, arthritis s/p left total knee arthroplasty.      Patient previously followed with Dr. Wilson, Dr. Mehran Schneider in cardiology. Patient was last seen in cardiology clinic on 9/28/2022. Hx of chest discomfort 8/2022 > went to the ED where testing showed normal high-sensitivity troponin; normal chest XR; BP was elevated at the time. On follow-up 9/28/2022, no further episodes of chest pain. LHC/coronary angiography (2021) showed patient stents; cardiac MRI at the time showed no evidence of myocarditis.      Of note, patient had recent visit to the ED (4/4/2023) for ~ 2 hour history of crushing sub-sternal chest pain; no significant relief with nitroglycerin. Troponin trend- 394 > 1312 > 1379. ST segment elevation in inferior leads > taken emergently to cath lab. Diagnostic coronary angiography showed no acute plaque rupture or significant stenosis. Patient started on indomethacin myopericarditis; colchicine was also ordered but patient never started due to concerns of potential side effects / drug interaction     Emery presented to cardiology clinic on 4/28/2023. Overall, chest pains are much improved. Had mild episode of chest discomfort last night (lasted for an hour). Symptoms are sharp in nature. He notes good adherence to indomethacin. No dyspnea, orthopnea, PND, syncope, pre-syncope, fever/chills, nausea/vomiting or pedal edema. No clear trigger for myopericarditis; patient had a viral URI fall 2022. Only other recent med changes was course of Bactrim just prior to admission for myopericarditis. Patient was continue on ~ 6 week course of indomethacin.      Emery presented to cardiology clinic on 6/5/2023 for a follow-up visit. Chest pains have  resolved. He stopped indomethacin on 5/28/2023. He has had atypical brief pleuritic pains with deep breathing lasting for seconds. Episodes have been infrequent and brief. If chest pains return or pleuritic pains worse plan would then be for a 2-4 weeks course of colchicine. Repeat limited TTE to re-assess LVEF, any evidence of acute pericardial process.      Limited TTE (6/21/2023) showed normal LV systolic function; normal wall motion, LVEF 60-65%; impaired relaxation pattern of LV diastolic filling; no pericardial effusion.      Emery presented to cardiology clinic for a follow-up on 7/11/2023. No further episodes of chest pain off indomethacin. Discussed with patient that he is OK'd from cardiology perspective to re-initiate exercise plan now that myopericarditis has resolved.     Patient presented to cardiology clinic on 8/2/2024.  No further episodes of chest pain.  Patient previously had myopericarditis that improved with colchicine.  Tolerating physical activity without active cardiovascular complaints.  Given history of atherosclerotic heart disease complicated by STEMI requiring coronary PCI will add metoprolol XL 25 mg daily.  Dyslipidemia is currently well-controlled.      Past Medical History:   He has a past medical history of Essential (primary) hypertension (09/22/2013), Personal history of other endocrine, nutritional and metabolic disease (05/29/2018), and Unspecified amblyopia, left eye.    Surgical History:   He has a past surgical history that includes Other surgical history (09/14/2022); Hemorrhoid surgery (04/02/2018); Coronary angioplasty with stent (04/02/2018); Total knee arthroplasty (04/02/2018); Other surgical history (06/19/2020); Other surgical history (06/17/2015); Rotator cuff repair (02/14/2014); MR angio head wo IV contrast (7/27/2023); and MR angio neck wo IV contrast (7/27/2023).    Family History:   Family History   Problem Relation Name Age of Onset    Hypertension Mother       Heart failure Father      Diabetes Father      Diabetes Daughter         Allergies:  Codeine and Naproxen     Social History:   - non-smoker; no significant alcohol use.     Prior Cardiovascular Testing (Personally Reviewed):     TTE (4/2023)- normal LV systolic function; mild diastolic dysfunction; LVEF 60-65%; mildly elevated RVSP; no significant pericardial effusion     LHC (4/4/2023)- mild CAD with patent LAD-D2 stents in right dominant system     MRI(3/12/21): 65%, NRWA, no scar, infiltrate, inflammation, no myocarditis.      Echo(3/11/21): EF 60%, impaired relaxation, no significant valvular disease     Cath(3/11/21): patent stents LAD/D1; < 10% ISR mLAD, dLAD 30%, OM2 10-30%       Review of Systems:  Review of Systems   Constitutional: Negative.   HENT: Negative.     Eyes: Negative.    Cardiovascular:  Negative for chest pain, dyspnea on exertion, near-syncope, orthopnea, palpitations, paroxysmal nocturnal dyspnea and syncope.   Respiratory: Negative.     Endocrine: Negative.    Hematologic/Lymphatic: Negative.    Skin: Negative.    Musculoskeletal: Negative.    Gastrointestinal: Negative.    Genitourinary: Negative.    Neurological: Negative.    Psychiatric/Behavioral: Negative.     Allergic/Immunologic: Negative.        Outpatient Medications:    Current Outpatient Medications:     amLODIPine (Norvasc) 5 mg tablet, TAKE 1 TABLET BY MOUTH EVERY DAY, Disp: 90 tablet, Rfl: 3    aspirin 81 mg chewable tablet, Chew 1 tablet (81 mg) once daily., Disp: , Rfl:     atorvastatin (Lipitor) 80 mg tablet, Take 1 tablet (80 mg) by mouth once daily., Disp: 90 tablet, Rfl: 1    chlorhexidine (Peridex) 0.12 % solution, RINSE 15 ML'S TWICE DAILY AFTER BREAKFAST/BEFORE BEDTIME FOLLOWING BRUSHING AND FLOSSING, AND SPIT, Disp: , Rfl:     famotidine (Pepcid) 40 mg tablet, TAKE 1 TABLET BEDTIME, Disp: 90 tablet, Rfl: 3    finasteride (Proscar) 5 mg tablet, TAKE 1 TABLET BY MOUTH EVERY DAY, Disp: 90 tablet, Rfl: 3    lancets 33  gauge misc, Check blood sugars 2 times daily, Disp: , Rfl:     lisinopril 40 mg tablet, TAKE 1 TABLET BY MOUTH EVERY DAY, Disp: 90 tablet, Rfl: 3    meclizine (Antivert) 25 mg tablet, Take 1 tablet (25 mg) by mouth 3 times a day as needed for dizziness., Disp: , Rfl:     meloxicam (Mobic) 15 mg tablet, Take 1 tablet (15 mg) by mouth., Disp: , Rfl:     multivitamin tablet, Take 1 tablet by mouth once daily., Disp: , Rfl:     nitroglycerin (Nitrostat) 0.4 mg SL tablet, PLACE 1 TABLET (0.4 MG) UNDER THE TONGUE EVERY 5 MINUTES AS NEEDED FOR CHEST PAIN, Disp: 300 tablet, Rfl: 1    omega-3 fatty acids-fish oil (Fish OiL) 340-1,000 mg capsule, Take by mouth., Disp: , Rfl:     OneTouch Delica Plus Lancet 33 gauge misc, CHECK BLOOD SUGARS 2 TIMES DAILY, Disp: , Rfl:     OneTouch Ultra Test strip, USE TO TEST TWICE DAILY AS DIRECTED, Disp: , Rfl:     OneTouch Ultra2 Meter misc, USE TO TEST BLOOD SUGAR TWICE DAILY, Disp: , Rfl:     tamsulosin (Flomax) 0.4 mg 24 hr capsule, Take 1 capsule (0.4 mg) by mouth once daily., Disp: 90 capsule, Rfl: 1     Last Recorded Vitals  /82 (BP Location: Right arm, Patient Position: Sitting)   Pulse 78   Resp 16   Wt 85.3 kg (188 lb)   SpO2 98%   BMI 28.17 kg/m²     Physical Exam:    Physical Exam  Constitutional:       General: He is not in acute distress.  HENT:      Head: Normocephalic.      Mouth/Throat:      Mouth: Mucous membranes are moist.   Eyes:      Extraocular Movements: Extraocular movements intact.      Conjunctiva/sclera: Conjunctivae normal.   Neck:      Vascular: No JVD.   Cardiovascular:      Rate and Rhythm: Normal rate and regular rhythm.      Heart sounds: No murmur heard.  Pulmonary:      Effort: Pulmonary effort is normal. No respiratory distress.      Breath sounds: Normal breath sounds.   Abdominal:      General: There is no distension.   Musculoskeletal:         General: No swelling.   Skin:     General: Skin is warm and dry.   Neurological:      General: No  "focal deficit present.      Mental Status: He is alert.      Cranial Nerves: No cranial nerve deficit.      Motor: No weakness.   Psychiatric:         Mood and Affect: Mood normal.         Behavior: Behavior normal.         Lab/Radiology/Diagnostic Review:    Labs    Lab Results   Component Value Date    GLUCOSE 101 (H) 06/24/2024    CALCIUM 9.3 06/24/2024     06/24/2024    K 4.5 06/24/2024    CO2 22 06/24/2024     06/24/2024    BUN 18 06/24/2024    CREATININE 1.09 06/24/2024       Lab Results   Component Value Date    WBC 6.1 06/24/2024    HGB 16.1 06/24/2024    HCT 48.7 06/24/2024    MCV 89 06/24/2024     06/24/2024       Lab Results   Component Value Date    CHOL 145 06/24/2024    CHOL 127 04/05/2023    CHOL 168 01/17/2023     Lab Results   Component Value Date    HDL 52.4 06/24/2024    HDL 43.0 04/05/2023    HDL 56.7 01/17/2023     Lab Results   Component Value Date    LDLCALC 69 06/24/2024     Lab Results   Component Value Date    TRIG 117 06/24/2024    TRIG 96 04/05/2023    TRIG 90 01/17/2023     No components found for: \"CHOLHDL\"    Lab Results   Component Value Date    BNP 19 07/27/2023    BNP 11 04/04/2023    BNP 19 03/10/2021       Lab Results   Component Value Date    TSH 1.64 06/24/2024       Assessment:    74 y.o.  male who presents today for a follow-up visit. Pst medical history of CAD s/p PCI to LAD (2014) s/p D1 stenting, HTN, DLD, Type II DM c/b neuropathy, arthritis s/p left total knee arthroplasty.     Patient presented to cardiology clinic on 8/2/2024.  No further episodes of chest pain.  Patient previously had myopericarditis that improved with colchicine.  Tolerating physical activity without active cardiovascular complaints.  Given history of atherosclerotic heart disease complicated by STEMI requiring coronary PCI will add metoprolol XL 25 mg daily.  Dyslipidemia is currently well-controlled.    Overall Plan:  1.  CAD c/b STEMI s/p remote stenting  - continue " aspirin 81 mg daily, atorvastatin 80 mg daily  - continue lisinopril 40 mg daily   - Add metoprolol XL 25 mg daily  - encouraged heart healthy diet and regular physical activity     2. Hx of Myopericarditis (Resolved; off indomethacin since 5/28/2023)  - etiology unclear, ? post-viral; improved after ~ 6 week course of colchicine     3. Primary Hypertension (goal BP < 130/90 mmHg)  - continue amlodipine; increase to 10 mg daily if sub-optimal control  - continue lisinopril   - Add metoprolol XL 25 mg daily     4. DLD (goal LDL < 70 mg/dl; at goal)- continue atorvastatin      Disposition- return to cardiology clinic in ~ 12 months or earlier if needed    Thank you for your visit today. Please contact our office with any questions.     Alexis Molina MD

## 2024-08-02 NOTE — PATIENT INSTRUCTIONS
For your heart history we recommend starting metoprolol XL 25 mg daily.     We will see you back in heart clinic in ~ 1 year or earlier if needed.     Please call my nurse Zelda with any questions; her contact information is below.     Thank you for your visit today. Please contact our office (via [facility] or phone) with any additional questions.     [facility]    Zelda, RN/Clinic Nurse for:    Dr. Jesse Montano    [address], [address]  [place] [address]    Phone: [phone] Press Option 5 then Option 3 to speak with the Clinic Nurse (Zelda)    _____    To Reach:    Billing Questions -    [phone]  Scheduling / Rescheduling -  Option 1  Refills / Medication Requests -  Option 3  General Office /  -  Option 4  Results -     Option 6  Medical Records -    Option 7  Repeat Options -    Option 9

## 2024-08-16 DIAGNOSIS — N40.0 BENIGN PROSTATIC HYPERPLASIA, UNSPECIFIED WHETHER LOWER URINARY TRACT SYMPTOMS PRESENT: ICD-10-CM

## 2024-08-16 RX ORDER — TAMSULOSIN HYDROCHLORIDE 0.4 MG/1
0.4 CAPSULE ORAL DAILY
Qty: 90 CAPSULE | Refills: 1 | Status: SHIPPED | OUTPATIENT
Start: 2024-08-16

## 2024-09-09 DIAGNOSIS — E78.5 HYPERLIPIDEMIA, UNSPECIFIED HYPERLIPIDEMIA TYPE: Chronic | ICD-10-CM

## 2024-09-10 RX ORDER — ATORVASTATIN CALCIUM 80 MG/1
80 TABLET, FILM COATED ORAL DAILY
Qty: 90 TABLET | Refills: 1 | Status: SHIPPED | OUTPATIENT
Start: 2024-09-10

## 2024-09-23 PROBLEM — M65.312 TRIGGER THUMB OF LEFT HAND: Status: ACTIVE | Noted: 2024-08-07

## 2024-09-23 PROBLEM — G56.02 CARPAL TUNNEL SYNDROME OF LEFT WRIST: Status: ACTIVE | Noted: 2024-08-07

## 2024-09-23 PROBLEM — K21.9 GERD (GASTROESOPHAGEAL REFLUX DISEASE): Status: ACTIVE | Noted: 2024-08-09

## 2024-09-23 PROBLEM — I63.9 STROKE (MULTI): Status: ACTIVE | Noted: 2024-08-09

## 2024-09-24 DIAGNOSIS — I10 ESSENTIAL (PRIMARY) HYPERTENSION: ICD-10-CM

## 2024-09-24 PROBLEM — N18.31 CHRONIC KIDNEY DISEASE, STAGE 3A (MULTI): Status: ACTIVE | Noted: 2024-09-24

## 2024-09-24 RX ORDER — LISINOPRIL 40 MG/1
40 TABLET ORAL DAILY
Qty: 90 TABLET | Refills: 3 | Status: SHIPPED | OUTPATIENT
Start: 2024-09-24

## 2024-09-24 RX ORDER — TRAMADOL HYDROCHLORIDE 50 MG/1
TABLET ORAL
COMMUNITY
Start: 2024-08-15 | End: 2024-09-25 | Stop reason: ALTCHOICE

## 2024-09-25 ENCOUNTER — OFFICE VISIT (OUTPATIENT)
Dept: CARDIOLOGY | Facility: CLINIC | Age: 74
End: 2024-09-25
Payer: MEDICARE

## 2024-09-25 VITALS
HEART RATE: 83 BPM | DIASTOLIC BLOOD PRESSURE: 70 MMHG | BODY MASS INDEX: 28.24 KG/M2 | HEIGHT: 69 IN | WEIGHT: 190.7 LBS | SYSTOLIC BLOOD PRESSURE: 110 MMHG | OXYGEN SATURATION: 96 %

## 2024-09-25 DIAGNOSIS — E78.5 HYPERLIPIDEMIA, UNSPECIFIED HYPERLIPIDEMIA TYPE: ICD-10-CM

## 2024-09-25 DIAGNOSIS — I25.10 CORONARY ARTERY DISEASE INVOLVING NATIVE CORONARY ARTERY OF NATIVE HEART WITHOUT ANGINA PECTORIS: Primary | ICD-10-CM

## 2024-09-25 DIAGNOSIS — I25.2 HISTORY OF ST ELEVATION MYOCARDIAL INFARCTION (STEMI): ICD-10-CM

## 2024-09-25 DIAGNOSIS — Z95.5 S/P CORONARY ARTERY STENT PLACEMENT: ICD-10-CM

## 2024-09-25 DIAGNOSIS — I10 HYPERTENSION, UNSPECIFIED TYPE: ICD-10-CM

## 2024-09-25 PROCEDURE — 1123F ACP DISCUSS/DSCN MKR DOCD: CPT | Performed by: INTERNAL MEDICINE

## 2024-09-25 PROCEDURE — 1159F MED LIST DOCD IN RCRD: CPT | Performed by: INTERNAL MEDICINE

## 2024-09-25 PROCEDURE — 3074F SYST BP LT 130 MM HG: CPT | Performed by: INTERNAL MEDICINE

## 2024-09-25 PROCEDURE — 99214 OFFICE O/P EST MOD 30 MIN: CPT | Performed by: INTERNAL MEDICINE

## 2024-09-25 PROCEDURE — 3078F DIAST BP <80 MM HG: CPT | Performed by: INTERNAL MEDICINE

## 2024-09-25 PROCEDURE — 1036F TOBACCO NON-USER: CPT | Performed by: INTERNAL MEDICINE

## 2024-09-25 PROCEDURE — 1126F AMNT PAIN NOTED NONE PRSNT: CPT | Performed by: INTERNAL MEDICINE

## 2024-09-25 ASSESSMENT — ENCOUNTER SYMPTOMS
OCCASIONAL FEELINGS OF UNSTEADINESS: 0
GASTROINTESTINAL NEGATIVE: 1
MUSCULOSKELETAL NEGATIVE: 1
LOSS OF SENSATION IN FEET: 0
ALLERGIC/IMMUNOLOGIC NEGATIVE: 1
CARDIOVASCULAR NEGATIVE: 1
ENDOCRINE NEGATIVE: 1
EYES NEGATIVE: 1
DEPRESSION: 0
RESPIRATORY NEGATIVE: 1
NEUROLOGICAL NEGATIVE: 1
CONSTITUTIONAL NEGATIVE: 1
PSYCHIATRIC NEGATIVE: 1
HEMATOLOGIC/LYMPHATIC NEGATIVE: 1

## 2024-09-25 ASSESSMENT — COLUMBIA-SUICIDE SEVERITY RATING SCALE - C-SSRS
6. HAVE YOU EVER DONE ANYTHING, STARTED TO DO ANYTHING, OR PREPARED TO DO ANYTHING TO END YOUR LIFE?: NO
1. IN THE PAST MONTH, HAVE YOU WISHED YOU WERE DEAD OR WISHED YOU COULD GO TO SLEEP AND NOT WAKE UP?: NO
2. HAVE YOU ACTUALLY HAD ANY THOUGHTS OF KILLING YOURSELF?: NO

## 2024-09-25 ASSESSMENT — PAIN SCALES - GENERAL: PAINLEVEL: 0-NO PAIN

## 2024-09-25 NOTE — PROGRESS NOTES
Preventive Cardiology Clinic Note    Reason for Visit: No chief complaint on file..  Referring Clinician: No ref. provider found     History of Present Illness: Mr. Pedraza is a 74-year-old gentleman with history of coronary artery disease status post PCI to the LAD in 2014 with hypertension and hyperlipidemia who is here for a follow-up visit.  Since his last visit he has not had any intercurrent health events or hospitalizations.  He does not have any exertional chest pain, shortness of breath, palpitations, or syncope.  He is adherent to his medications without any adverse effects.  His primary cardiologist recently added metoprolol for his blood pressure and heart rate which is now well-controlled in today's office visit.  He had several questions about taking recommended vaccinations including influenza, COVID-19, and RSV.  He recently got both influenza and RSV vaccinations.    TTE (4/2023)- normal LV systolic function; mild diastolic dysfunction; LVEF 60-65%; mildly elevated RVSP; no significant pericardial effusion     LHC (4/4/2023)- mild CAD with patent LAD-D2 stents in right dominant system     MRI(3/12/21): 65%, NRWA, no scar, infiltrate, inflammation, no myocarditis.      Echo(3/11/21): EF 60%, impaired relaxation, no significant valvular disease     Cath(3/11/21): patent stents LAD/D1; < 10% ISR mLAD, dLAD 30%, OM2 10-30%    Past Medical History:  He has a past medical history of Essential (primary) hypertension (09/22/2013), Personal history of other endocrine, nutritional and metabolic disease (05/29/2018), and Unspecified amblyopia, left eye.    Past Surgical History:  He has a past surgical history that includes Other surgical history (09/14/2022); Hemorrhoid surgery (04/02/2018); Coronary angioplasty with stent (04/02/2018); Total knee arthroplasty (04/02/2018); Other surgical history (06/19/2020); Other surgical history (06/17/2015); Rotator cuff repair (02/14/2014); MR angio head wo IV contrast  (7/27/2023); and MR angio neck wo IV contrast (7/27/2023).    Social History:  He reports that he has quit smoking. His smoking use included cigarettes. He has been exposed to tobacco smoke. He has never used smokeless tobacco. He reports current alcohol use. He reports that he does not use drugs.    Family History:  Family History   Problem Relation Name Age of Onset    Hypertension Mother      Heart failure Father      Diabetes Father      Diabetes Daughter         Allergies:  Codeine and Naproxen    Outpatient Medications:  Current Outpatient Medications   Medication Instructions    amLODIPine (Norvasc) 5 mg tablet TAKE 1 TABLET BY MOUTH EVERY DAY    aspirin 81 mg, oral, Daily    atorvastatin (LIPITOR) 80 mg, oral, Daily    chlorhexidine (Peridex) 0.12 % solution RINSE 15 ML'S TWICE DAILY AFTER BREAKFAST/BEFORE BEDTIME FOLLOWING BRUSHING AND FLOSSING, AND SPIT    famotidine (PEPCID) 40 mg, oral, Nightly    finasteride (PROSCAR) 5 mg, oral, Daily    lancets 33 gauge misc Check blood sugars 2 times daily    lisinopril 40 mg, oral, Daily    meloxicam (MOBIC) 15 mg, oral    metoprolol succinate XL (TOPROL XL) 25 mg, oral, Daily, Do not crush or chew.    multivitamin tablet 1 tablet, oral, Daily    nitroglycerin (NITROSTAT) 0.4 mg, sublingual, Every 5 min PRN    omega-3 fatty acids-fish oil (Fish OiL) 340-1,000 mg capsule oral    OneTouch Delica Plus Lancet 33 gauge misc CHECK BLOOD SUGARS 2 TIMES DAILY    OneTouch Ultra Test strip USE TO TEST TWICE DAILY AS DIRECTED    OneTouch Ultra2 Meter misc USE TO TEST BLOOD SUGAR TWICE DAILY    tamsulosin (FLOMAX) 0.4 mg, oral, Daily    traMADol (Ultram) 50 mg tablet TAKE 1 TABLET BY MOUTH EVERY 8 HOURS AS NEEDED FOR UP TO 3 DAYS.       Review of Systems:  Review of Systems   Constitutional: Negative.   HENT: Negative.     Eyes: Negative.    Cardiovascular: Negative.    Respiratory: Negative.     Endocrine: Negative.    Hematologic/Lymphatic: Negative.    Skin: Negative.   "  Musculoskeletal: Negative.    Gastrointestinal: Negative.    Genitourinary: Negative.    Neurological: Negative.    Psychiatric/Behavioral: Negative.     Allergic/Immunologic: Negative.        Last Recorded Vitals:  Vitals:    09/25/24 0808   BP: 110/70   BP Location: Left arm   Patient Position: Sitting   BP Cuff Size: Large adult   Pulse: 83   SpO2: 96%   Weight: 86.5 kg (190 lb 11.2 oz)   Height: 1.74 m (5' 8.5\")       Physical Examination:  General: Well appearing, well-nourished, in no acute distress.  HEENT: Normocephalic atraumatic, pupils equal and reactive to light, extraocular muscles intact, no conjunctival injection, oropharynx clear without exudates.  Neck: Normal carotid arterial pulses, no arterial bruits, no thyromegaly.  Cardiac: Regular rhythm and normal heart rate.  S1, S2 present and normal.  No murmurs, rubs, or gallops.  PMI is nondisplaced. Jugular venous pulsations are normal.  Pulmonary: Normal breath sounds, no increased work of breathing, no wheezes or crackles.  GI: Normal bowel sounds, abdominal aorta not enlarged, no hepatosplenomegaly, no abdominal bruits.  Lower extremities: No cyanosis, clubbing, or edema.  No xanthelasma present. Normal distal pulses.  Skin: Skin intact. No significant rashes or lesions present.  Neuro: Alert and oriented x 3, normal attention and cognition, no focal motor or sensory neurologic deficits.  Psych: Normal affect and mood.  Musculoskeletal: Normal gait normal muscle tone.    Laboratory Studies:  Lab Results   Component Value Date    GLUCOSE 101 (H) 06/24/2024    CALCIUM 9.3 06/24/2024     06/24/2024    K 4.5 06/24/2024    CO2 22 06/24/2024     06/24/2024    BUN 18 06/24/2024    CREATININE 1.09 06/24/2024     Lab Results   Component Value Date    ALT 19 06/24/2024    AST 19 06/24/2024    ALKPHOS 66 06/24/2024    BILITOT 0.9 06/24/2024         Lab Results   Component Value Date    CHOL 145 06/24/2024    CHOL 127 04/05/2023    CHOL 168 " "01/17/2023     Lab Results   Component Value Date    HDL 52.4 06/24/2024    HDL 43.0 04/05/2023    HDL 56.7 01/17/2023     Lab Results   Component Value Date    LDLCALC 69 06/24/2024     Lab Results   Component Value Date    TRIG 117 06/24/2024    TRIG 96 04/05/2023    TRIG 90 01/17/2023     No components found for: \"CHOLHDL\"  Lab Results   Component Value Date    HGBA1C 5.5 06/24/2024     UACR undetectable    Cardiology Tests:  ECG:  ECG 12 lead (Clinic Performed) 08/02/2024  Normal sinus rhythm; normal ECG     Assessment and Plan:  Problem List Items Addressed This Visit          Cardiac and Vasculature    Hyperlipidemia (Chronic)    Hypertension (Chronic)     Other Visit Diagnoses       Coronary artery disease involving native coronary artery of native heart without angina pectoris    -  Primary    S/P coronary artery stent placement        History of ST elevation myocardial infarction (STEMI)              Mr. Pedraza is a 74-year-old gentleman with history of coronary artery disease status post PCI to the LAD in 2014 with hypertension and hyperlipidemia who is here for a follow-up visit.  He is asymptomatic from a cardiovascular standpoint and is on appropriate secondary prevention medication with well-controlled risk factors including blood pressure, lipids, and glycemia.  I do not recommend any changes to his medical regimen today.  We discussed routine vaccinations as recommended by professional societies and I encouraged him to discuss with his primary care physician the risks and benefits of each vaccine.  I will be happy to see him again in 1 year here in the office for routine follow-up.  Please do not hesitate call or contact me with questions or concerns.    Mehran Schneider MD, FAHA, [facility]  Director,  [facility]  Director,  [facility] Program  Associate Professor of Medicine  [employer]      "

## 2024-11-11 ENCOUNTER — LAB (OUTPATIENT)
Dept: LAB | Facility: LAB | Age: 74
End: 2024-11-11
Payer: MEDICARE

## 2024-11-11 DIAGNOSIS — Z79.899 OTHER LONG TERM (CURRENT) DRUG THERAPY: ICD-10-CM

## 2024-11-11 DIAGNOSIS — M11.20 OTHER CHONDROCALCINOSIS, UNSPECIFIED SITE: ICD-10-CM

## 2024-11-11 DIAGNOSIS — M06.4 INFLAMMATORY POLYARTHROPATHY (MULTI): Primary | ICD-10-CM

## 2024-11-11 LAB
ALBUMIN SERPL BCP-MCNC: 4.6 G/DL (ref 3.4–5)
ALP SERPL-CCNC: 81 U/L (ref 33–136)
ALT SERPL W P-5'-P-CCNC: 21 U/L (ref 10–52)
AST SERPL W P-5'-P-CCNC: 18 U/L (ref 9–39)
BASOPHILS # BLD AUTO: 0.08 X10*3/UL (ref 0–0.1)
BASOPHILS NFR BLD AUTO: 1.4 %
BILIRUB DIRECT SERPL-MCNC: 0.2 MG/DL (ref 0–0.3)
BILIRUB SERPL-MCNC: 0.8 MG/DL (ref 0–1.2)
CALCIUM SERPL-MCNC: 9.6 MG/DL (ref 8.6–10.6)
CCP IGG SERPL-ACNC: <1 U/ML
CREAT SERPL-MCNC: 1.21 MG/DL (ref 0.5–1.3)
CRP SERPL-MCNC: 0.13 MG/DL
EGFRCR SERPLBLD CKD-EPI 2021: 62 ML/MIN/1.73M*2
EOSINOPHIL # BLD AUTO: 0.12 X10*3/UL (ref 0–0.4)
EOSINOPHIL NFR BLD AUTO: 2 %
ERYTHROCYTE [DISTWIDTH] IN BLOOD BY AUTOMATED COUNT: 13.2 % (ref 11.5–14.5)
ERYTHROCYTE [SEDIMENTATION RATE] IN BLOOD BY WESTERGREN METHOD: <1 MM/H (ref 0–20)
FERRITIN SERPL-MCNC: 186 NG/ML (ref 20–300)
GLIADIN PEPTIDE IGA SER IA-ACNC: <1 U/ML
HCT VFR BLD AUTO: 49.9 % (ref 41–52)
HGB BLD-MCNC: 16.4 G/DL (ref 13.5–17.5)
IMM GRANULOCYTES # BLD AUTO: 0.02 X10*3/UL (ref 0–0.5)
IMM GRANULOCYTES NFR BLD AUTO: 0.3 % (ref 0–0.9)
IRON SERPL-MCNC: 89 UG/DL (ref 35–150)
LYMPHOCYTES # BLD AUTO: 1.18 X10*3/UL (ref 0.8–3)
LYMPHOCYTES NFR BLD AUTO: 20 %
MAGNESIUM SERPL-MCNC: 2.26 MG/DL (ref 1.6–2.4)
MCH RBC QN AUTO: 29 PG (ref 26–34)
MCHC RBC AUTO-ENTMCNC: 32.9 G/DL (ref 32–36)
MCV RBC AUTO: 88 FL (ref 80–100)
MONOCYTES # BLD AUTO: 0.54 X10*3/UL (ref 0.05–0.8)
MONOCYTES NFR BLD AUTO: 9.2 %
NEUTROPHILS # BLD AUTO: 3.96 X10*3/UL (ref 1.6–5.5)
NEUTROPHILS NFR BLD AUTO: 67.1 %
NRBC BLD-RTO: 0 /100 WBCS (ref 0–0)
PHOSPHATE SERPL-MCNC: 4.5 MG/DL (ref 2.5–4.9)
PLATELET # BLD AUTO: 218 X10*3/UL (ref 150–450)
PROT SERPL-MCNC: 6.4 G/DL (ref 6.4–8.2)
RBC # BLD AUTO: 5.65 X10*6/UL (ref 4.5–5.9)
RHEUMATOID FACT SER NEPH-ACNC: <10 IU/ML (ref 0–15)
TSH SERPL-ACNC: 1.8 MIU/L (ref 0.44–3.98)
WBC # BLD AUTO: 5.9 X10*3/UL (ref 4.4–11.3)

## 2024-11-11 PROCEDURE — 83516 IMMUNOASSAY NONANTIBODY: CPT

## 2024-11-11 PROCEDURE — 86140 C-REACTIVE PROTEIN: CPT

## 2024-11-11 PROCEDURE — 36415 COLL VENOUS BLD VENIPUNCTURE: CPT

## 2024-11-11 PROCEDURE — 84100 ASSAY OF PHOSPHORUS: CPT

## 2024-11-11 PROCEDURE — 83970 ASSAY OF PARATHORMONE: CPT

## 2024-11-11 PROCEDURE — 86200 CCP ANTIBODY: CPT

## 2024-11-11 PROCEDURE — 83735 ASSAY OF MAGNESIUM: CPT

## 2024-11-11 PROCEDURE — 84443 ASSAY THYROID STIM HORMONE: CPT

## 2024-11-11 PROCEDURE — 85652 RBC SED RATE AUTOMATED: CPT

## 2024-11-11 PROCEDURE — 83540 ASSAY OF IRON: CPT

## 2024-11-11 PROCEDURE — 82728 ASSAY OF FERRITIN: CPT

## 2024-11-11 PROCEDURE — 80076 HEPATIC FUNCTION PANEL: CPT

## 2024-11-11 PROCEDURE — 82565 ASSAY OF CREATININE: CPT

## 2024-11-11 PROCEDURE — 86431 RHEUMATOID FACTOR QUANT: CPT

## 2024-11-11 PROCEDURE — 82310 ASSAY OF CALCIUM: CPT

## 2024-11-11 PROCEDURE — 85025 COMPLETE CBC W/AUTO DIFF WBC: CPT

## 2024-11-12 LAB — PTH-INTACT SERPL-MCNC: 70.3 PG/ML (ref 18.5–88)

## 2025-01-04 DIAGNOSIS — Z87.19 PERSONAL HISTORY OF OTHER DISEASES OF THE DIGESTIVE SYSTEM: ICD-10-CM

## 2025-01-04 RX ORDER — FAMOTIDINE 40 MG/1
40 TABLET, FILM COATED ORAL NIGHTLY
Qty: 90 TABLET | Refills: 0 | Status: SHIPPED | OUTPATIENT
Start: 2025-01-04

## 2025-02-15 DIAGNOSIS — N40.0 BENIGN PROSTATIC HYPERPLASIA, UNSPECIFIED WHETHER LOWER URINARY TRACT SYMPTOMS PRESENT: ICD-10-CM

## 2025-02-17 RX ORDER — TAMSULOSIN HYDROCHLORIDE 0.4 MG/1
0.4 CAPSULE ORAL DAILY
Qty: 90 CAPSULE | Refills: 1 | Status: SHIPPED | OUTPATIENT
Start: 2025-02-17

## 2025-03-06 DIAGNOSIS — E78.5 HYPERLIPIDEMIA, UNSPECIFIED HYPERLIPIDEMIA TYPE: Chronic | ICD-10-CM

## 2025-03-06 RX ORDER — ATORVASTATIN CALCIUM 80 MG/1
80 TABLET, FILM COATED ORAL DAILY
Qty: 90 TABLET | Refills: 1 | Status: SHIPPED | OUTPATIENT
Start: 2025-03-06

## 2025-03-14 DIAGNOSIS — I10 HYPERTENSION, UNSPECIFIED TYPE: Chronic | ICD-10-CM

## 2025-03-14 DIAGNOSIS — Z12.5 SCREENING PSA (PROSTATE SPECIFIC ANTIGEN): ICD-10-CM

## 2025-03-14 DIAGNOSIS — E78.5 HYPERLIPIDEMIA, UNSPECIFIED HYPERLIPIDEMIA TYPE: Chronic | ICD-10-CM

## 2025-03-28 LAB
ALBUMIN SERPL-MCNC: 4.4 G/DL (ref 3.6–5.1)
ALP SERPL-CCNC: 81 U/L (ref 35–144)
ALT SERPL-CCNC: 26 U/L (ref 9–46)
ANION GAP SERPL CALCULATED.4IONS-SCNC: 9 MMOL/L (CALC) (ref 7–17)
AST SERPL-CCNC: 20 U/L (ref 10–35)
BASOPHILS # BLD AUTO: 69 CELLS/UL (ref 0–200)
BASOPHILS NFR BLD AUTO: 1.3 %
BILIRUB SERPL-MCNC: 0.9 MG/DL (ref 0.2–1.2)
BUN SERPL-MCNC: 18 MG/DL (ref 7–25)
CALCIUM SERPL-MCNC: 9 MG/DL (ref 8.6–10.3)
CHLORIDE SERPL-SCNC: 106 MMOL/L (ref 98–110)
CHOLEST SERPL-MCNC: 160 MG/DL
CHOLEST/HDLC SERPL: 3 (CALC)
CO2 SERPL-SCNC: 25 MMOL/L (ref 20–32)
CREAT SERPL-MCNC: 1.04 MG/DL (ref 0.7–1.28)
EGFRCR SERPLBLD CKD-EPI 2021: 74 ML/MIN/1.73M2
EOSINOPHIL # BLD AUTO: 90 CELLS/UL (ref 15–500)
EOSINOPHIL NFR BLD AUTO: 1.7 %
ERYTHROCYTE [DISTWIDTH] IN BLOOD BY AUTOMATED COUNT: 13.2 % (ref 11–15)
GLUCOSE SERPL-MCNC: 94 MG/DL (ref 65–99)
HCT VFR BLD AUTO: 49.9 % (ref 38.5–50)
HDLC SERPL-MCNC: 54 MG/DL
HGB BLD-MCNC: 16.6 G/DL (ref 13.2–17.1)
LDLC SERPL CALC-MCNC: 90 MG/DL (CALC)
LYMPHOCYTES # BLD AUTO: 933 CELLS/UL (ref 850–3900)
LYMPHOCYTES NFR BLD AUTO: 17.6 %
MCH RBC QN AUTO: 29.4 PG (ref 27–33)
MCHC RBC AUTO-ENTMCNC: 33.3 G/DL (ref 32–36)
MCV RBC AUTO: 88.3 FL (ref 80–100)
MONOCYTES # BLD AUTO: 419 CELLS/UL (ref 200–950)
MONOCYTES NFR BLD AUTO: 7.9 %
NEUTROPHILS # BLD AUTO: 3790 CELLS/UL (ref 1500–7800)
NEUTROPHILS NFR BLD AUTO: 71.5 %
NONHDLC SERPL-MCNC: 106 MG/DL (CALC)
PLATELET # BLD AUTO: 219 THOUSAND/UL (ref 140–400)
PMV BLD REES-ECKER: 11.2 FL (ref 7.5–12.5)
POTASSIUM SERPL-SCNC: 4.3 MMOL/L (ref 3.5–5.3)
PROT SERPL-MCNC: 6.3 G/DL (ref 6.1–8.1)
PSA SERPL-MCNC: 1.91 NG/ML
RBC # BLD AUTO: 5.65 MILLION/UL (ref 4.2–5.8)
SODIUM SERPL-SCNC: 140 MMOL/L (ref 135–146)
TRIGL SERPL-MCNC: 75 MG/DL
TSH SERPL-ACNC: 1.46 MIU/L (ref 0.4–4.5)
WBC # BLD AUTO: 5.3 THOUSAND/UL (ref 3.8–10.8)

## 2025-04-04 ENCOUNTER — APPOINTMENT (OUTPATIENT)
Dept: PRIMARY CARE | Facility: CLINIC | Age: 75
End: 2025-04-04
Payer: MEDICARE

## 2025-04-04 VITALS
DIASTOLIC BLOOD PRESSURE: 68 MMHG | OXYGEN SATURATION: 97 % | SYSTOLIC BLOOD PRESSURE: 100 MMHG | BODY MASS INDEX: 29.35 KG/M2 | HEART RATE: 69 BPM | TEMPERATURE: 97.3 F | HEIGHT: 67 IN | WEIGHT: 187 LBS

## 2025-04-04 DIAGNOSIS — G57.90 NEUROPATHY OF LOWER EXTREMITY, UNSPECIFIED LATERALITY: ICD-10-CM

## 2025-04-04 DIAGNOSIS — E78.5 HYPERLIPIDEMIA, UNSPECIFIED HYPERLIPIDEMIA TYPE: Chronic | ICD-10-CM

## 2025-04-04 DIAGNOSIS — G60.8 IDIOPATHIC SMALL FIBER SENSORY NEUROPATHY: ICD-10-CM

## 2025-04-04 DIAGNOSIS — Z00.00 ENCOUNTER FOR ANNUAL WELLNESS VISIT (AWV) IN MEDICARE PATIENT: Primary | ICD-10-CM

## 2025-04-04 DIAGNOSIS — I63.9 CEREBROVASCULAR ACCIDENT (CVA), UNSPECIFIED MECHANISM (MULTI): ICD-10-CM

## 2025-04-04 DIAGNOSIS — I21.3 ST ELEVATION MYOCARDIAL INFARCTION (STEMI), UNSPECIFIED ARTERY (MULTI): ICD-10-CM

## 2025-04-04 DIAGNOSIS — Z00.00 ROUTINE GENERAL MEDICAL EXAMINATION AT HEALTH CARE FACILITY: ICD-10-CM

## 2025-04-04 DIAGNOSIS — Z71.89 ADVANCE DIRECTIVE DISCUSSED WITH PATIENT: ICD-10-CM

## 2025-04-04 DIAGNOSIS — I10 HYPERTENSION, UNSPECIFIED TYPE: Chronic | ICD-10-CM

## 2025-04-04 DIAGNOSIS — G57.93 NEUROPATHY OF BOTH FEET: ICD-10-CM

## 2025-04-04 PROBLEM — R73.01 IMPAIRED FASTING GLUCOSE: Status: RESOLVED | Noted: 2023-08-31 | Resolved: 2025-04-04

## 2025-04-04 PROBLEM — E04.1 THYROID NODULE: Status: RESOLVED | Noted: 2023-08-31 | Resolved: 2025-04-04

## 2025-04-04 PROBLEM — M65.30 TRIGGER FINGER: Status: RESOLVED | Noted: 2024-06-28 | Resolved: 2025-04-04

## 2025-04-04 PROBLEM — M65.312 TRIGGER THUMB OF LEFT HAND: Status: RESOLVED | Noted: 2024-08-07 | Resolved: 2025-04-04

## 2025-04-04 PROBLEM — I25.10 ARTERIOSCLEROSIS OF CORONARY ARTERY: Status: RESOLVED | Noted: 2023-04-14 | Resolved: 2025-04-04

## 2025-04-04 PROBLEM — K21.9 GERD (GASTROESOPHAGEAL REFLUX DISEASE): Status: RESOLVED | Noted: 2024-08-09 | Resolved: 2025-04-04

## 2025-04-04 PROBLEM — G56.02 CARPAL TUNNEL SYNDROME OF LEFT WRIST: Status: RESOLVED | Noted: 2024-08-07 | Resolved: 2025-04-04

## 2025-04-04 PROBLEM — N40.0 BPH (BENIGN PROSTATIC HYPERPLASIA): Status: RESOLVED | Noted: 2023-04-14 | Resolved: 2025-04-04

## 2025-04-04 PROBLEM — N18.31 CHRONIC KIDNEY DISEASE, STAGE 3A (MULTI): Status: RESOLVED | Noted: 2024-09-24 | Resolved: 2025-04-04

## 2025-04-04 RX ORDER — MELOXICAM 15 MG/1
15 TABLET ORAL NIGHTLY PRN
Start: 2025-04-04

## 2025-04-04 RX ORDER — PREDNISOLONE ACETATE 10 MG/ML
1 SUSPENSION/ DROPS OPHTHALMIC 4 TIMES DAILY
COMMUNITY
Start: 2025-03-04

## 2025-04-04 ASSESSMENT — ENCOUNTER SYMPTOMS
EYE PAIN: 0
CONFUSION: 0
CHEST TIGHTNESS: 0
SPEECH DIFFICULTY: 0
CHILLS: 0
BLOOD IN STOOL: 0
SINUS PAIN: 0
FACIAL ASYMMETRY: 0
WOUND: 0
ADENOPATHY: 0
SORE THROAT: 0
NAUSEA: 0
GASTROINTESTINAL NEGATIVE: 1
ABDOMINAL PAIN: 0
DIFFICULTY URINATING: 0
TREMORS: 0
NUMBNESS: 1
SINUS PRESSURE: 0
FATIGUE: 0
ABDOMINAL DISTENTION: 0
DIARRHEA: 0
HALLUCINATIONS: 0
MYALGIAS: 0
BRUISES/BLEEDS EASILY: 0
DECREASED CONCENTRATION: 0
EYE DISCHARGE: 0
DIZZINESS: 0
ANAL BLEEDING: 0
NECK STIFFNESS: 0
UNEXPECTED WEIGHT CHANGE: 0
FEVER: 0
OCCASIONAL FEELINGS OF UNSTEADINESS: 0
DYSURIA: 0
LOSS OF SENSATION IN FEET: 1
DEPRESSION: 0
BACK PAIN: 0
HEADACHES: 0
RECTAL PAIN: 0
TROUBLE SWALLOWING: 0
VOMITING: 0
POLYDIPSIA: 0
NERVOUS/ANXIOUS: 0
LIGHT-HEADEDNESS: 0
FACIAL SWELLING: 0
RESPIRATORY NEGATIVE: 1
SEIZURES: 0
AGITATION: 0
STRIDOR: 0
DYSPHORIC MOOD: 0
FLANK PAIN: 0
APPETITE CHANGE: 0
VOICE CHANGE: 0
DIAPHORESIS: 0
COUGH: 0
FREQUENCY: 0
EYE REDNESS: 0
PALPITATIONS: 0
ACTIVITY CHANGE: 0
SLEEP DISTURBANCE: 0
EYE ITCHING: 0
CONSTIPATION: 0
ARTHRALGIAS: 0
CARDIOVASCULAR NEGATIVE: 1

## 2025-04-04 ASSESSMENT — PATIENT HEALTH QUESTIONNAIRE - PHQ9
10. IF YOU CHECKED OFF ANY PROBLEMS, HOW DIFFICULT HAVE THESE PROBLEMS MADE IT FOR YOU TO DO YOUR WORK, TAKE CARE OF THINGS AT HOME, OR GET ALONG WITH OTHER PEOPLE: NOT DIFFICULT AT ALL
2. FEELING DOWN, DEPRESSED OR HOPELESS: NOT AT ALL
1. LITTLE INTEREST OR PLEASURE IN DOING THINGS: SEVERAL DAYS
SUM OF ALL RESPONSES TO PHQ9 QUESTIONS 1 AND 2: 1

## 2025-04-04 ASSESSMENT — ACTIVITIES OF DAILY LIVING (ADL)
GROCERY_SHOPPING: INDEPENDENT
DOING_HOUSEWORK: INDEPENDENT
MANAGING_FINANCES: INDEPENDENT
DRESSING: INDEPENDENT
TAKING_MEDICATION: INDEPENDENT
BATHING: INDEPENDENT

## 2025-04-04 NOTE — PROGRESS NOTES
Advance Care Planning Note     Discussion Date: 04/04/25   Discussion Participants: patient    The patient wishes to discuss Advance Care Planning today and the following is a brief summary of our discussion.     Patient has capacity to make their own medical decisions: Yes  Health Care Agent/Surrogate Decision Maker documented in chart: Yes    Documents on file and valid:  Advance Directive/Living Will: No   Health Care Power of : No  Other: discussed and code status updated  Full code  Communication of Medical Status/Prognosis:   good    Communication of Treatment Goals/Options:   good    Treatment Decisions  Goals of Care: survival is prioritized, if goals for quality or survival can reasonably be achieved   agree  Follow Up Plan  Discuss next year  Team Members  PCP  Time Statement: Total face to face time spent on advance care planning was 16 minutes with 16 minutes spent in counseling, including the explanation.    Radha Looney ASCVD Risk score (Jean BURTON, et al., 2019) failed to calculate for the following reasons:    Risk score cannot be calculated because patient has a medical history suggesting prior/existing ASCVD  Time spent was 10 mins reviewingSubjective   Patient ID: Emery Pedraza is a 74 y.o. male who presents for Medicare Annual Wellness Visit Subsequent.    Patient overall has been doing well.  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    Has had no issues following up with cardiology regularly has had no troubles with chest pain shortness breath no dizziness no lightheadedness no troubles with abdominal pain or discomfort no swelling of the legs or feet no fever no chills no night sweats.  Up maybe once at night to urinate         Alcohol intake: none  Caffeine intake: 13 ounces coffee a day  Exercise: walking.     Last Colonoscopy: 2019  Last Pap smear: N/A  Mammogram:N/A  Last Dexa scan:N/A    Shingles vaccine: 2021  TdaP vaccine:     Review of Systems    Constitutional:  Negative for activity change, appetite change, chills, diaphoresis, fatigue, fever and unexpected weight change.   HENT: Negative.  Negative for congestion, dental problem, ear discharge, ear pain, facial swelling, hearing loss, mouth sores, nosebleeds, postnasal drip, sinus pressure, sinus pain, sore throat, tinnitus, trouble swallowing and voice change.    Eyes:  Negative for pain, discharge, redness, itching and visual disturbance.   Respiratory: Negative.  Negative for cough, chest tightness and stridor.    Cardiovascular: Negative.  Negative for chest pain, palpitations and leg swelling.        Seeing Jesse    Gastrointestinal: Negative.  Negative for abdominal distention, abdominal pain, anal bleeding, blood in stool, constipation, diarrhea, nausea, rectal pain and vomiting.   Endocrine: Negative for cold intolerance, heat intolerance, polydipsia and polyuria.   Genitourinary:  Positive for enuresis (1 time at night). Negative for difficulty urinating, dysuria, flank pain, frequency, genital sores, penile discharge, penile swelling and testicular pain.   Musculoskeletal:  Negative for arthralgias, back pain, gait problem, myalgias and neck stiffness.        Has issues with trigger finger   Skin:  Negative for rash and wound.   Allergic/Immunologic: Negative for environmental allergies, food allergies and immunocompromised state.   Neurological:  Positive for numbness. Negative for dizziness, tremors, seizures, facial asymmetry, speech difficulty, light-headedness and headaches.        Neuropathy feet   Hematological:  Negative for adenopathy. Does not bruise/bleed easily.   Psychiatric/Behavioral:  Negative for agitation, behavioral problems, confusion, decreased concentration, dysphoric mood, hallucinations, self-injury, sleep disturbance and suicidal ideas. The patient is not nervous/anxious.         Neuropathy       Objective   /68   Pulse 69   Temp 36.3 °C (97.3 °F)   Ht  "1.702 m (5' 7\")   Wt 84.8 kg (187 lb)   SpO2 97%   BMI 29.29 kg/m²   BSA Body surface area is 2 meters squared.      Physical Exam  Constitutional:       General: He is not in acute distress.     Appearance: Normal appearance. He is not ill-appearing or toxic-appearing.   HENT:      Head: Normocephalic.      Right Ear: Tympanic membrane normal.      Left Ear: Tympanic membrane normal.      Nose: Nose normal.   Eyes:      Extraocular Movements: Extraocular movements intact.      Conjunctiva/sclera: Conjunctivae normal.      Pupils: Pupils are equal, round, and reactive to light.   Cardiovascular:      Rate and Rhythm: Normal rate and regular rhythm.      Pulses: Normal pulses.      Heart sounds: Normal heart sounds.   Pulmonary:      Effort: Pulmonary effort is normal.      Breath sounds: Normal breath sounds. No wheezing or rhonchi.   Abdominal:      General: Abdomen is flat. Bowel sounds are normal. There is no distension.      Palpations: Abdomen is soft.      Tenderness: There is no abdominal tenderness. There is no right CVA tenderness or rebound.      Hernia: No hernia is present.   Genitourinary:     Penis: Normal.       Testes: Normal.      Prostate: Normal.      Comments: Slight enlargement without nodularity  Musculoskeletal:         General: Normal range of motion.      Cervical back: Normal range of motion and neck supple.      Right lower leg: No edema.   Lymphadenopathy:      Cervical: No cervical adenopathy.   Skin:     General: Skin is warm and dry.      Capillary Refill: Capillary refill takes less than 2 seconds.      Findings: No bruising.   Neurological:      General: No focal deficit present.      Mental Status: He is alert and oriented to person, place, and time. Mental status is at baseline.      Cranial Nerves: No cranial nerve deficit.      Sensory: No sensory deficit.      Motor: No weakness.      Coordination: Coordination normal.      Gait: Gait normal.      Deep Tendon Reflexes: " Reflexes normal.      Comments: Decree sensation with monofilament testing of the feet   Psychiatric:         Mood and Affect: Mood normal.         Behavior: Behavior normal.         Thought Content: Thought content normal.         Judgment: Judgment normal.       Orders Only on 03/14/2025   Component Date Value Ref Range Status    WHITE BLOOD CELL COUNT 03/28/2025 5.3  3.8 - 10.8 Thousand/uL Final    RED BLOOD CELL COUNT 03/28/2025 5.65  4.20 - 5.80 Million/uL Final    HEMOGLOBIN 03/28/2025 16.6  13.2 - 17.1 g/dL Final    HEMATOCRIT 03/28/2025 49.9  38.5 - 50.0 % Final    MCV 03/28/2025 88.3  80.0 - 100.0 fL Final    MCH 03/28/2025 29.4  27.0 - 33.0 pg Final    MCHC 03/28/2025 33.3  32.0 - 36.0 g/dL Final    Comment: For adults, a slight decrease in the calculated MCHC  value (in the range of 30 to 32 g/dL) is most likely  not clinically significant; however, it should be  interpreted with caution in correlation with other  red cell parameters and the patient's clinical  condition.      RDW 03/28/2025 13.2  11.0 - 15.0 % Final    PLATELET COUNT 03/28/2025 219  140 - 400 Thousand/uL Final    MPV 03/28/2025 11.2  7.5 - 12.5 fL Final    ABSOLUTE NEUTROPHILS 03/28/2025 3,790  1,500 - 7,800 cells/uL Final    ABSOLUTE LYMPHOCYTES 03/28/2025 933  850 - 3,900 cells/uL Final    ABSOLUTE MONOCYTES 03/28/2025 419  200 - 950 cells/uL Final    ABSOLUTE EOSINOPHILS 03/28/2025 90  15 - 500 cells/uL Final    ABSOLUTE BASOPHILS 03/28/2025 69  0 - 200 cells/uL Final    NEUTROPHILS 03/28/2025 71.5  % Final    LYMPHOCYTES 03/28/2025 17.6  % Final    MONOCYTES 03/28/2025 7.9  % Final    EOSINOPHILS 03/28/2025 1.7  % Final    BASOPHILS 03/28/2025 1.3  % Final    GLUCOSE 03/28/2025 94  65 - 99 mg/dL Final    Comment:               Fasting reference interval         UREA NITROGEN (BUN) 03/28/2025 18  7 - 25 mg/dL Final    CREATININE 03/28/2025 1.04  0.70 - 1.28 mg/dL Final    EGFR 03/28/2025 74  > OR = 60 mL/min/1.73m2 Final    SODIUM  03/28/2025 140  135 - 146 mmol/L Final    POTASSIUM 03/28/2025 4.3  3.5 - 5.3 mmol/L Final    CHLORIDE 03/28/2025 106  98 - 110 mmol/L Final    CARBON DIOXIDE 03/28/2025 25  20 - 32 mmol/L Final    ELECTROLYTE BALANCE 03/28/2025 9  7 - 17 mmol/L (calc) Final    CALCIUM 03/28/2025 9.0  8.6 - 10.3 mg/dL Final    PROTEIN, TOTAL 03/28/2025 6.3  6.1 - 8.1 g/dL Final    ALBUMIN 03/28/2025 4.4  3.6 - 5.1 g/dL Final    BILIRUBIN, TOTAL 03/28/2025 0.9  0.2 - 1.2 mg/dL Final    ALKALINE PHOSPHATASE 03/28/2025 81  35 - 144 U/L Final    AST 03/28/2025 20  10 - 35 U/L Final    ALT 03/28/2025 26  9 - 46 U/L Final    CHOLESTEROL, TOTAL 03/28/2025 160  <200 mg/dL Final    HDL CHOLESTEROL 03/28/2025 54  > OR = 40 mg/dL Final    TRIGLYCERIDES 03/28/2025 75  <150 mg/dL Final    LDL-CHOLESTEROL 03/28/2025 90  mg/dL (calc) Final    Comment: Reference range: <100     Desirable range <100 mg/dL for primary prevention;    <70 mg/dL for patients with CHD or diabetic patients   with > or = 2 CHD risk factors.     LDL-C is now calculated using the Chilango-Lara   calculation, which is a validated novel method providing   better accuracy than the Friedewald equation in the   estimation of LDL-C.   Chilango VITALE et al. MARSHALL. 2013;310(19): 4240-5055   (http://education.[employer].MedTel.com/faq/JFH095)      CHOL/HDLC RATIO 03/28/2025 3.0  <5.0 (calc) Final    NON HDL CHOLESTEROL 03/28/2025 106  <130 mg/dL (calc) Final    Comment: For patients with diabetes plus 1 major ASCVD risk   factor, treating to a non-HDL-C goal of <100 mg/dL   (LDL-C of <70 mg/dL) is considered a therapeutic   option.      TSH W/REFLEX TO FT4 03/28/2025 1.46  0.40 - 4.50 mIU/L Final    PSA, TOTAL 03/28/2025 1.91  < OR = 4.00 ng/mL Final    Comment: The total PSA value from this assay system is   standardized against the WHO standard. The test   result will be approximately 20% lower when compared   to the equimolar-standardized total PSA (Reena   Carver). Comparison of  serial PSA results should be   interpreted with this fact in mind.     This test was performed using the Siemens   chemiluminescent method. Values obtained from   different assay methods cannot be used  interchangeably. PSA levels, regardless of  value, should not be interpreted as absolute  evidence of the presence or absence of disease.     Lab on 11/11/2024   Component Date Value Ref Range Status    C-Reactive Protein 11/11/2024 0.13  <1.00 mg/dL Final    Sedimentation Rate 11/11/2024 <1  0 - 20 mm/h Final    Rheumatoid Factor 11/11/2024 <10  0 - 15 IU/mL Final    Citrulline Antibody, IgG 11/11/2024 <1  <3 U/mL Final    NEGATIVE < 3 U/ML  POSITIVE >=3 U/ML    Deamidated Gliadin Antibody IgA 11/11/2024 <1.0  <15.0 U/mL Final    False negative Deamidated Gliadin Peptide Antibody, IgA   results can occur in patients already adhering to a   gluten-free diet or patients with IgA deficiency.   Tissue Transglutaminase Antibody, IgA is the preferred   test for screening patients with suspected Celiac Disease.            Albumin 11/11/2024 4.6  3.4 - 5.0 g/dL Final    Bilirubin, Total 11/11/2024 0.8  0.0 - 1.2 mg/dL Final    Bilirubin, Direct 11/11/2024 0.2  0.0 - 0.3 mg/dL Final    Alkaline Phosphatase 11/11/2024 81  33 - 136 U/L Final    ALT 11/11/2024 21  10 - 52 U/L Final    Patients treated with Sulfasalazine may generate falsely decreased results for ALT.    AST 11/11/2024 18  9 - 39 U/L Final    Total Protein 11/11/2024 6.4  6.4 - 8.2 g/dL Final    WBC 11/11/2024 5.9  4.4 - 11.3 x10*3/uL Final    nRBC 11/11/2024 0.0  0.0 - 0.0 /100 WBCs Final    RBC 11/11/2024 5.65  4.50 - 5.90 x10*6/uL Final    Hemoglobin 11/11/2024 16.4  13.5 - 17.5 g/dL Final    Hematocrit 11/11/2024 49.9  41.0 - 52.0 % Final    MCV 11/11/2024 88  80 - 100 fL Final    MCH 11/11/2024 29.0  26.0 - 34.0 pg Final    MCHC 11/11/2024 32.9  32.0 - 36.0 g/dL Final    RDW 11/11/2024 13.2  11.5 - 14.5 % Final    Platelets 11/11/2024 218  150 - 450  x10*3/uL Final    Neutrophils % 11/11/2024 67.1  40.0 - 80.0 % Final    Immature Granulocytes %, Automated 11/11/2024 0.3  0.0 - 0.9 % Final    Immature Granulocyte Count (IG) includes promyelocytes, myelocytes and metamyelocytes but does not include bands. Percent differential counts (%) should be interpreted in the context of the absolute cell counts (cells/UL).    Lymphocytes % 11/11/2024 20.0  13.0 - 44.0 % Final    Monocytes % 11/11/2024 9.2  2.0 - 10.0 % Final    Eosinophils % 11/11/2024 2.0  0.0 - 6.0 % Final    Basophils % 11/11/2024 1.4  0.0 - 2.0 % Final    Neutrophils Absolute 11/11/2024 3.96  1.60 - 5.50 x10*3/uL Final    Percent differential counts (%) should be interpreted in the context of the absolute cell counts (cells/uL).    Immature Granulocytes Absolute, Au* 11/11/2024 0.02  0.00 - 0.50 x10*3/uL Final    Lymphocytes Absolute 11/11/2024 1.18  0.80 - 3.00 x10*3/uL Final    Monocytes Absolute 11/11/2024 0.54  0.05 - 0.80 x10*3/uL Final    Eosinophils Absolute 11/11/2024 0.12  0.00 - 0.40 x10*3/uL Final    Basophils Absolute 11/11/2024 0.08  0.00 - 0.10 x10*3/uL Final    Creatinine 11/11/2024 1.21  0.50 - 1.30 mg/dL Final    eGFR 11/11/2024 62  >60 mL/min/1.73m*2 Final    Calculations of estimated GFR are performed using the 2021 CKD-EPI Study Refit equation without the race variable for the IDMS-Traceable creatinine methods.  https://jasn.asnjournals.org/content/early/2021/09/22/ASN.3888065712    Phosphorus 11/11/2024 4.5  2.5 - 4.9 mg/dL Final    The performance characteristics of phosphorus testing in heparinized plasma have been validated by the individual  laboratory site where testing is performed. Testing on heparinized plasma is not approved by the FDA; however, such approval is not necessary.    Parathyroid Hormone, Intact 11/11/2024 70.3  18.5 - 88.0 pg/mL Final    Iron 11/11/2024 89  35 - 150 ug/dL Final    Magnesium 11/11/2024 2.26  1.60 - 2.40 mg/dL Final    Thyroid Stimulating  Hormone 11/11/2024 1.80  0.44 - 3.98 mIU/L Final    Calcium 11/11/2024 9.6  8.6 - 10.6 mg/dL Final    Ferritin 11/11/2024 186  20 - 300 ng/mL Final   Lab on 06/24/2024   Component Date Value Ref Range Status    WBC 06/24/2024 6.1  4.4 - 11.3 x10*3/uL Final    nRBC 06/24/2024 0.0  0.0 - 0.0 /100 WBCs Final    RBC 06/24/2024 5.46  4.50 - 5.90 x10*6/uL Final    Hemoglobin 06/24/2024 16.1  13.5 - 17.5 g/dL Final    Hematocrit 06/24/2024 48.7  41.0 - 52.0 % Final    MCV 06/24/2024 89  80 - 100 fL Final    MCH 06/24/2024 29.5  26.0 - 34.0 pg Final    MCHC 06/24/2024 33.1  32.0 - 36.0 g/dL Final    RDW 06/24/2024 13.0  11.5 - 14.5 % Final    Platelets 06/24/2024 226  150 - 450 x10*3/uL Final    Neutrophils % 06/24/2024 71.9  40.0 - 80.0 % Final    Immature Granulocytes %, Automated 06/24/2024 0.3  0.0 - 0.9 % Final    Immature Granulocyte Count (IG) includes promyelocytes, myelocytes and metamyelocytes but does not include bands. Percent differential counts (%) should be interpreted in the context of the absolute cell counts (cells/UL).    Lymphocytes % 06/24/2024 16.0  13.0 - 44.0 % Final    Monocytes % 06/24/2024 7.9  2.0 - 10.0 % Final    Eosinophils % 06/24/2024 2.3  0.0 - 6.0 % Final    Basophils % 06/24/2024 1.6  0.0 - 2.0 % Final    Neutrophils Absolute 06/24/2024 4.39  1.60 - 5.50 x10*3/uL Final    Percent differential counts (%) should be interpreted in the context of the absolute cell counts (cells/uL).    Immature Granulocytes Absolute, Au* 06/24/2024 0.02  0.00 - 0.50 x10*3/uL Final    Lymphocytes Absolute 06/24/2024 0.98  0.80 - 3.00 x10*3/uL Final    Monocytes Absolute 06/24/2024 0.48  0.05 - 0.80 x10*3/uL Final    Eosinophils Absolute 06/24/2024 0.14  0.00 - 0.40 x10*3/uL Final    Basophils Absolute 06/24/2024 0.10  0.00 - 0.10 x10*3/uL Final    Glucose 06/24/2024 101 (H)  74 - 99 mg/dL Final    Sodium 06/24/2024 140  136 - 145 mmol/L Final    Potassium 06/24/2024 4.5  3.5 - 5.3 mmol/L Final    Chloride  06/24/2024 107  98 - 107 mmol/L Final    Bicarbonate 06/24/2024 22  21 - 32 mmol/L Final    Anion Gap 06/24/2024 16  10 - 20 mmol/L Final    Urea Nitrogen 06/24/2024 18  6 - 23 mg/dL Final    Creatinine 06/24/2024 1.09  0.50 - 1.30 mg/dL Final    eGFR 06/24/2024 70  >60 mL/min/1.73m*2 Final    Calculations of estimated GFR are performed using the 2021 CKD-EPI Study Refit equation without the race variable for the IDMS-Traceable creatinine methods.  https://jasn.asnjournals.org/content/early/2021/09/22/ASN.1543881245    Calcium 06/24/2024 9.3  8.6 - 10.6 mg/dL Final    Albumin 06/24/2024 4.4  3.4 - 5.0 g/dL Final    Alkaline Phosphatase 06/24/2024 66  33 - 136 U/L Final    Total Protein 06/24/2024 6.4  6.4 - 8.2 g/dL Final    AST 06/24/2024 19  9 - 39 U/L Final    Bilirubin, Total 06/24/2024 0.9  0.0 - 1.2 mg/dL Final    ALT 06/24/2024 19  10 - 52 U/L Final    Patients treated with Sulfasalazine may generate falsely decreased results for ALT.    Cholesterol 06/24/2024 145  0 - 199 mg/dL Final          Age      Desirable   Borderline High   High     0-19 Y     0 - 169       170 - 199     >/= 200    20-24 Y     0 - 189       190 - 224     >/= 225         >24 Y     0 - 199       200 - 239     >/= 240   **All ranges are based on fasting samples. Specific   therapeutic targets will vary based on patient-specific   cardiac risk.    Pediatric guidelines reference:Pediatrics 2011, 128(S5).Adult guidelines reference: NCEP ATPIII Guidelines,MARSHALL 2001, 258:2486-97    Venipuncture immediately after or during the administration of Metamizole may lead to falsely low results. Testing should be performed immediately prior to Metamizole dosing.    HDL-Cholesterol 06/24/2024 52.4  mg/dL Final      Age       Very Low   Low     Normal    High    0-19 Y    < 35      < 40     40-45     ----  20-24 Y    ----     < 40      >45      ----        >24 Y      ----     < 40     40-60      >60      Cholesterol/HDL Ratio 06/24/2024 2.8   Final       Ref Values  Desirable  < 3.4  High Risk  > 5.0    LDL Calculated 06/24/2024 69  <=99 mg/dL Final                                Near   Borderline      AGE      Desirable  Optimal    High     High     Very High     0-19 Y     0 - 109     ---    110-129   >/= 130     ----    20-24 Y     0 - 119     ---    120-159   >/= 160     ----      >24 Y     0 -  99   100-129  130-159   160-189     >/=190      VLDL 06/24/2024 23  0 - 40 mg/dL Final    Triglycerides 06/24/2024 117  0 - 149 mg/dL Final       Age         Desirable   Borderline High   High     Very High   0 D-90 D    19 - 174         ----         ----        ----  91 D- 9 Y     0 -  74        75 -  99     >/= 100      ----    10-19 Y     0 -  89        90 - 129     >/= 130      ----    20-24 Y     0 - 114       115 - 149     >/= 150      ----         >24 Y     0 - 149       150 - 199    200- 499    >/= 500    Venipuncture immediately after or during the administration of Metamizole may lead to falsely low results. Testing should be performed immediately prior to Metamizole dosing.    Non HDL Cholesterol 06/24/2024 93  0 - 149 mg/dL Final          Age       Desirable   Borderline High   High     Very High     0-19 Y     0 - 119       120 - 144     >/= 145    >/= 160    20-24 Y     0 - 149       150 - 189     >/= 190      ----         >24 Y    30 mg/dL above LDL Cholesterol goal      Thyroid Stimulating Hormone 06/24/2024 1.64  0.44 - 3.98 mIU/L Final    Prostate Specific Antigen,Screen 06/24/2024 1.72  <=4.00 ng/mL Final    Hemoglobin A1C 06/24/2024 5.5  see below % Final    Estimated Average Glucose 06/24/2024 111  Not Established mg/dL Final    Albumin, Urine Random 06/24/2024 <7.0  Not established mg/L Final    Creatinine, Urine Random 06/24/2024 43.3  20.0 - 370.0 mg/dL Final    Albumin/Creatinine Ratio 06/24/2024    Final    One or more analytes used in this calculation is outside of the analytical measurement range. Calculation cannot be performed.     Current  Outpatient Medications on File Prior to Visit   Medication Sig Dispense Refill    amLODIPine (Norvasc) 5 mg tablet TAKE 1 TABLET BY MOUTH EVERY DAY 90 tablet 3    aspirin 81 mg chewable tablet Chew 1 tablet (81 mg) once daily.      atorvastatin (Lipitor) 80 mg tablet TAKE 1 TABLET BY MOUTH ONCE DAILY. 90 tablet 1    chlorhexidine (Peridex) 0.12 % solution RINSE 15 ML'S TWICE DAILY AFTER BREAKFAST/BEFORE BEDTIME FOLLOWING BRUSHING AND FLOSSING, AND SPIT      famotidine (Pepcid) 40 mg tablet TAKE 1 TABLET BY MOUTH EVERYDAY AT BEDTIME 90 tablet 0    finasteride (Proscar) 5 mg tablet TAKE 1 TABLET BY MOUTH EVERY DAY 90 tablet 3    lancets 33 gauge misc Check blood sugars 2 times daily      lisinopril 40 mg tablet TAKE 1 TABLET BY MOUTH EVERY DAY 90 tablet 3    meloxicam (Mobic) 15 mg tablet Take 1 tablet (15 mg) by mouth. (Patient taking differently: Take 1 tablet (15 mg) by mouth if needed.)      metoprolol succinate XL (Toprol XL) 25 mg 24 hr tablet Take 1 tablet (25 mg) by mouth once daily. Do not crush or chew. 90 tablet 3    multivitamin tablet Take 1 tablet by mouth once daily.      nitroglycerin (Nitrostat) 0.4 mg SL tablet PLACE 1 TABLET (0.4 MG) UNDER THE TONGUE EVERY 5 MINUTES AS NEEDED FOR CHEST PAIN 300 tablet 1    omega-3 fatty acids-fish oil (Fish OiL) 340-1,000 mg capsule Take by mouth.      OneTouch Delica Plus Lancet 33 gauge misc CHECK BLOOD SUGARS 2 TIMES DAILY      OneTouch Ultra Test strip USE TO TEST TWICE DAILY AS DIRECTED      OneTouch Ultra2 Meter misc USE TO TEST BLOOD SUGAR TWICE DAILY      prednisoLONE acetate (Pred-Forte) 1 % ophthalmic suspension Administer 1 drop into affected eye(s) 4 times a day.      tamsulosin (Flomax) 0.4 mg 24 hr capsule TAKE 1 CAPSULE BY MOUTH ONCE DAILY. 90 capsule 1     No current facility-administered medications on file prior to visit.     No images are attached to the encounter.            Assessment/Plan   Problem List Items Addressed This Visit              ICD-10-CM    Hyperlipidemia (Chronic) E78.5     Cholesterol level at goal         Hypertension (Chronic) I10     Blood pressure well-controlled         Neuropathy of both feet G57.93     This has been stable off of Neurontin at this time         STEMI (ST elevation myocardial infarction) (Multi) I21.3     This has been in the past has being followed by cardiology.  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

## 2025-04-04 NOTE — ASSESSMENT & PLAN NOTE
This has been in the past has being followed by cardiology.  Cholesterol levels being treated with statin therapy

## 2025-04-04 NOTE — PATIENT INSTRUCTIONS
Overall doing well.    Labs are at goal.    Please continue to follow-up with cardiology as directed.    Medications reviewed and reconciled    Labs have been reviewed with you today.    Please follow-up in 6 months

## 2025-04-05 DIAGNOSIS — Z87.19 PERSONAL HISTORY OF OTHER DISEASES OF THE DIGESTIVE SYSTEM: ICD-10-CM

## 2025-04-07 RX ORDER — FAMOTIDINE 40 MG/1
40 TABLET, FILM COATED ORAL NIGHTLY
Qty: 90 TABLET | Refills: 0 | Status: SHIPPED | OUTPATIENT
Start: 2025-04-07

## 2025-05-23 ENCOUNTER — APPOINTMENT (OUTPATIENT)
Dept: PRIMARY CARE | Facility: CLINIC | Age: 75
End: 2025-05-23
Payer: MEDICARE

## 2025-06-05 DIAGNOSIS — I10 ESSENTIAL (PRIMARY) HYPERTENSION: ICD-10-CM

## 2025-06-05 RX ORDER — AMLODIPINE BESYLATE 5 MG/1
5 TABLET ORAL DAILY
Qty: 90 TABLET | Refills: 3 | Status: SHIPPED | OUTPATIENT
Start: 2025-06-05

## 2025-07-04 DIAGNOSIS — Z87.19 PERSONAL HISTORY OF OTHER DISEASES OF THE DIGESTIVE SYSTEM: ICD-10-CM

## 2025-07-05 RX ORDER — FAMOTIDINE 40 MG/1
40 TABLET, FILM COATED ORAL NIGHTLY
Qty: 90 TABLET | Refills: 0 | Status: SHIPPED | OUTPATIENT
Start: 2025-07-05

## 2025-07-11 DIAGNOSIS — N40.0 BENIGN PROSTATIC HYPERPLASIA WITHOUT LOWER URINARY TRACT SYMPTOMS: ICD-10-CM

## 2025-07-11 RX ORDER — FINASTERIDE 5 MG/1
5 TABLET, FILM COATED ORAL DAILY
Qty: 90 TABLET | Refills: 3 | Status: SHIPPED | OUTPATIENT
Start: 2025-07-11

## 2025-07-26 DIAGNOSIS — I25.10 ARTERIOSCLEROSIS OF CORONARY ARTERY: ICD-10-CM

## 2025-07-26 DIAGNOSIS — I10 HYPERTENSION, UNSPECIFIED TYPE: ICD-10-CM

## 2025-07-26 DIAGNOSIS — I25.2 HISTORY OF ST ELEVATION MYOCARDIAL INFARCTION (STEMI): ICD-10-CM

## 2025-07-26 DIAGNOSIS — Z95.5 S/P CORONARY ARTERY STENT PLACEMENT: ICD-10-CM

## 2025-07-28 RX ORDER — METOPROLOL SUCCINATE 25 MG/1
25 TABLET, EXTENDED RELEASE ORAL DAILY
Qty: 90 TABLET | Refills: 0 | Status: SHIPPED | OUTPATIENT
Start: 2025-07-28

## 2025-08-04 ENCOUNTER — APPOINTMENT (OUTPATIENT)
Dept: CARDIOLOGY | Facility: CLINIC | Age: 75
End: 2025-08-04
Payer: MEDICARE

## 2025-08-04 PROBLEM — Z96.652 PRESENCE OF LEFT ARTIFICIAL KNEE JOINT: Status: ACTIVE | Noted: 2025-08-04

## 2025-08-22 ENCOUNTER — APPOINTMENT (OUTPATIENT)
Dept: CARDIOLOGY | Facility: CLINIC | Age: 75
End: 2025-08-22
Payer: MEDICARE

## 2025-08-22 VITALS — HEART RATE: 58 BPM | OXYGEN SATURATION: 96 % | BODY MASS INDEX: 29.6 KG/M2 | WEIGHT: 189 LBS

## 2025-08-22 DIAGNOSIS — I10 PRIMARY HYPERTENSION: ICD-10-CM

## 2025-08-22 DIAGNOSIS — Z95.5 S/P CORONARY ARTERY STENT PLACEMENT: ICD-10-CM

## 2025-08-22 DIAGNOSIS — I25.10 ARTERIOSCLEROSIS OF CORONARY ARTERY: ICD-10-CM

## 2025-08-22 DIAGNOSIS — I25.2 HISTORY OF ST ELEVATION MYOCARDIAL INFARCTION (STEMI): ICD-10-CM

## 2025-08-22 DIAGNOSIS — E78.5 HYPERLIPIDEMIA, UNSPECIFIED HYPERLIPIDEMIA TYPE: Chronic | ICD-10-CM

## 2025-08-22 PROBLEM — G57.93 NEUROPATHY OF BOTH FEET: Status: RESOLVED | Noted: 2023-08-31 | Resolved: 2025-08-22

## 2025-08-22 PROBLEM — G57.90 NEUROPATHY OF LOWER EXTREMITY: Status: RESOLVED | Noted: 2024-06-25 | Resolved: 2025-08-22

## 2025-08-22 PROCEDURE — 93000 ELECTROCARDIOGRAM COMPLETE: CPT | Performed by: STUDENT IN AN ORGANIZED HEALTH CARE EDUCATION/TRAINING PROGRAM

## 2025-08-22 ASSESSMENT — ENCOUNTER SYMPTOMS
HEMATOLOGIC/LYMPHATIC NEGATIVE: 1
ENDOCRINE NEGATIVE: 1
GASTROINTESTINAL NEGATIVE: 1
NEAR-SYNCOPE: 0
SYNCOPE: 0
EYES NEGATIVE: 1
DYSPNEA ON EXERTION: 0
PSYCHIATRIC NEGATIVE: 1
ORTHOPNEA: 0
CONSTITUTIONAL NEGATIVE: 1
ALLERGIC/IMMUNOLOGIC NEGATIVE: 1
PALPITATIONS: 0
NEUROLOGICAL NEGATIVE: 1
MUSCULOSKELETAL NEGATIVE: 1
PND: 0
RESPIRATORY NEGATIVE: 1

## 2025-08-24 LAB
ATRIAL RATE: 58 BPM
P AXIS: 76 DEGREES
P OFFSET: 205 MS
P ONSET: 144 MS
PR INTERVAL: 150 MS
Q ONSET: 219 MS
QRS COUNT: 9 BEATS
QRS DURATION: 96 MS
QT INTERVAL: 414 MS
QTC CALCULATION(BAZETT): 406 MS
QTC FREDERICIA: 409 MS
R AXIS: 18 DEGREES
T AXIS: 45 DEGREES
T OFFSET: 426 MS
VENTRICULAR RATE: 58 BPM

## 2026-04-03 ENCOUNTER — APPOINTMENT (OUTPATIENT)
Dept: PRIMARY CARE | Facility: CLINIC | Age: 76
End: 2026-04-03
Payer: MEDICARE

## 2026-04-10 ENCOUNTER — APPOINTMENT (OUTPATIENT)
Dept: PRIMARY CARE | Facility: CLINIC | Age: 76
End: 2026-04-10
Payer: MEDICARE

## 2026-08-27 ENCOUNTER — APPOINTMENT (OUTPATIENT)
Dept: CARDIOLOGY | Facility: CLINIC | Age: 76
End: 2026-08-27
Payer: MEDICARE